# Patient Record
Sex: FEMALE | Race: WHITE | NOT HISPANIC OR LATINO | Employment: FULL TIME | URBAN - NONMETROPOLITAN AREA
[De-identification: names, ages, dates, MRNs, and addresses within clinical notes are randomized per-mention and may not be internally consistent; named-entity substitution may affect disease eponyms.]

---

## 2017-08-31 ENCOUNTER — TRANSCRIBE ORDERS (OUTPATIENT)
Dept: MRI IMAGING | Facility: HOSPITAL | Age: 57
End: 2017-08-31

## 2017-08-31 DIAGNOSIS — M85.38: Primary | ICD-10-CM

## 2017-09-11 ENCOUNTER — HOSPITAL ENCOUNTER (OUTPATIENT)
Dept: MRI IMAGING | Facility: HOSPITAL | Age: 57
Discharge: HOME | End: 2017-09-11
Attending: ORTHOPAEDIC SURGERY | Admitting: ORTHOPAEDIC SURGERY

## 2017-09-11 DIAGNOSIS — M85.38: ICD-10-CM

## 2017-09-11 PROCEDURE — 71550 MRI CHEST W/O DYE: CPT

## 2017-10-17 ENCOUNTER — TRANSCRIBE ORDERS (OUTPATIENT)
Dept: ADMINISTRATIVE | Facility: HOSPITAL | Age: 57
End: 2017-10-17

## 2017-10-17 ENCOUNTER — HOSPITAL ENCOUNTER (OUTPATIENT)
Dept: CARDIOLOGY | Facility: HOSPITAL | Age: 57
Discharge: HOME | End: 2017-10-17
Attending: ORTHOPAEDIC SURGERY | Admitting: ORTHOPAEDIC SURGERY

## 2017-10-17 DIAGNOSIS — Z01.818 PRE-OP EXAM: Primary | ICD-10-CM

## 2017-10-17 LAB
ANION GAP SERPL CALCULATED.3IONS-SCNC: 14.4 MMOL/L
BUN BLD-MCNC: 17 MG/DL (ref 7–20)
BUN/CREAT SERPL: 18.9 (ref 7.1–23.5)
CALCIUM SPEC-SCNC: 9.3 MG/DL (ref 8.4–10.2)
CHLORIDE SERPL-SCNC: 106 MMOL/L (ref 98–107)
CO2 SERPL-SCNC: 29 MMOL/L (ref 26–30)
CREAT BLD-MCNC: 0.9 MG/DL (ref 0.6–1.3)
GFR SERPL CREATININE-BSD FRML MDRD: 64 ML/MIN/1.73
GLUCOSE BLD-MCNC: 85 MG/DL (ref 74–98)
POTASSIUM BLD-SCNC: 5.4 MMOL/L (ref 3.5–5.1)
SODIUM BLD-SCNC: 144 MMOL/L (ref 137–145)

## 2017-10-17 PROCEDURE — 80048 BASIC METABOLIC PNL TOTAL CA: CPT | Performed by: ORTHOPAEDIC SURGERY

## 2017-10-17 PROCEDURE — 93005 ELECTROCARDIOGRAM TRACING: CPT

## 2017-10-17 PROCEDURE — 36415 COLL VENOUS BLD VENIPUNCTURE: CPT | Performed by: ORTHOPAEDIC SURGERY

## 2017-10-17 PROCEDURE — 93005 ELECTROCARDIOGRAM TRACING: CPT | Performed by: ORTHOPAEDIC SURGERY

## 2019-11-11 DIAGNOSIS — M25.522 ARTHRALGIA OF LEFT ELBOW: Primary | ICD-10-CM

## 2019-11-19 ENCOUNTER — OFFICE VISIT (OUTPATIENT)
Dept: ORTHOPEDIC SURGERY | Facility: CLINIC | Age: 59
End: 2019-11-19

## 2019-11-19 VITALS — RESPIRATION RATE: 18 BRPM | HEIGHT: 67 IN | WEIGHT: 175 LBS | BODY MASS INDEX: 27.47 KG/M2

## 2019-11-19 DIAGNOSIS — M77.12 LATERAL EPICONDYLITIS OF LEFT ELBOW: ICD-10-CM

## 2019-11-19 DIAGNOSIS — M25.522 ARTHRALGIA OF LEFT ELBOW: Primary | ICD-10-CM

## 2019-11-19 PROCEDURE — 99203 OFFICE O/P NEW LOW 30 MIN: CPT | Performed by: PHYSICIAN ASSISTANT

## 2019-11-19 RX ORDER — OMEPRAZOLE 40 MG/1
CAPSULE, DELAYED RELEASE ORAL
COMMUNITY
Start: 2019-11-01 | End: 2020-08-20 | Stop reason: SINTOL

## 2019-11-19 RX ORDER — LORATADINE 10 MG/1
TABLET ORAL
COMMUNITY
Start: 2019-10-31 | End: 2020-08-20

## 2019-11-19 NOTE — PROGRESS NOTES
Subjective   Patient ID: Hayley Colon is a 59 y.o. right hand dominant female  Pain of the Left Elbow (No treatment, PCP did order EMG and MRI, no injury, ongoing for about five months)         History of Present Illness  Patient presents as a new patient with complaints of left elbow pain.  Pain is been ongoing for about 5 months.  No injury or trauma.  She denies numbness or tingling.  Her primary care doctor was able to order an MRI of the left elbow which she brings the results and disc with her.  She has tried ibuprofen which helps somewhat.  She attributes her left elbow pain due to overuse after having a right shoulder surgery she depended on her left upper extremity more so.    Pain Score: 5  Pain Location: (elbow)  Pain Orientation: Left     Pain Descriptors: Aching, Sharp  Pain Frequency: Intermittent  Pain Onset: Ongoing     Clinical Progression: Gradually worsening  Aggravating Factors: Bending, Straightening        Pain Intervention(s): Home medication, Rest, Cold pack  Result of Injury: No       Past Medical History:   Diagnosis Date   • GERD (gastroesophageal reflux disease)    • History of bone density study    • Periarthritis of shoulder    • Skin cancer, basal cell         Past Surgical History:   Procedure Laterality Date   • APPENDECTOMY     • SHOULDER SURGERY Right 1996    RCR, removed part of collar bone, bicep repair       Family History   Problem Relation Age of Onset   • Cancer Maternal Uncle         bone cancer   • Cancer Maternal Grandmother         bone       Social History     Socioeconomic History   • Marital status:      Spouse name: Not on file   • Number of children: Not on file   • Years of education: Not on file   • Highest education level: Not on file   Tobacco Use   • Smoking status: Never Smoker   • Smokeless tobacco: Never Used   Substance and Sexual Activity   • Alcohol use: No     Frequency: Never   • Drug use: No   • Sexual activity: Defer         Current Outpatient  "Medications:   •  Ibuprofen-diphenhydrAMINE Cit (ADVIL PM PO), Take  by mouth., Disp: , Rfl:   •  loratadine (CLARITIN) 10 MG tablet, , Disp: , Rfl:   •  omeprazole (priLOSEC) 40 MG capsule, , Disp: , Rfl:     No Known Allergies    Review of Systems   Constitutional: Negative for diaphoresis, fever and unexpected weight change.   HENT: Negative for dental problem and sore throat.    Eyes: Negative for visual disturbance.   Respiratory: Negative for shortness of breath.    Cardiovascular: Negative for chest pain.   Gastrointestinal: Negative for abdominal pain, constipation, diarrhea, nausea and vomiting.   Genitourinary: Negative for difficulty urinating and frequency.   Musculoskeletal: Positive for arthralgias.   Neurological: Negative for headaches.   Hematological: Does not bruise/bleed easily.       I have reviewed the above medical and surgical history, family history, social history, medications, allergies and review of systems.    Objective   Resp 18   Ht 168.9 cm (66.5\")   Wt 79.4 kg (175 lb)   BMI 27.82 kg/m²    Physical Exam   Constitutional: She is oriented to person, place, and time. She appears well-developed and well-nourished.   Eyes: Conjunctivae are normal.   Pulmonary/Chest: Effort normal.   Musculoskeletal:        Left elbow: She exhibits normal range of motion, no swelling, no effusion, no deformity and no laceration. Tenderness found. Lateral epicondyle tenderness noted.        Left wrist: She exhibits normal range of motion, no tenderness, no bony tenderness, no swelling, no effusion, no crepitus, no deformity and no laceration.        Left hand: She exhibits normal range of motion, no tenderness, normal capillary refill and no deformity. Normal sensation noted. Normal strength noted.   Neurological: She is alert and oriented to person, place, and time.   Psychiatric: She has a normal mood and affect. Her behavior is normal.   Nursing note and vitals reviewed.    Left Elbow Exam     Range " of Motion   Extension: normal   Flexion: normal   Pronation: normal   Supination: normal     Muscle Strength   The patient has normal left elbow strength.    Tests   Tinel's sign (cubital tunnel): negative    Other   Sensation: normal  Pulse: present             Neurologic Exam     Mental Status   Oriented to person, place, and time.               Assessment/Plan   Independent Review of Radiographic Studies:    No new imaging done today.    I did review MRI results with the patient the left elbow.  There does appear to be inflammation of the common extensor tendon  Procedures       Hayley was seen today for pain.    Diagnoses and all orders for this visit:    Arthralgia of left elbow    Lateral epicondylitis of left elbow       Orthopedic activities reviewed and patient expressed appreciation  Discussion of orthopedic goals  Risk, benefits, and merits of treatment alternatives reviewed with the patient and questions answered  Physical therapy referral given    Recommendations/Plan:  Exercise, medications, injections, other patient advice, and return appointment as noted.  Patient is encouraged to call or return for any issues or concerns.    Will refer to hand OT for a custom wrist brace.  Patient was given an elbow counterforce brace here in the office.  She was instructed on ice applications and anti-inflammatory use.  Patient agreeable to call or return sooner for any concerns.           EMR Dragon-transcription disclaimer:  This encounter note is an electronic transcription of spoken language to printed text.  Electronic transcription of spoken language may permit erroneous or at times nonsensical words or phrases to be inadvertently transcribed.  Although I have reviewed the note for such errors, some may still exist

## 2020-08-20 ENCOUNTER — OFFICE VISIT (OUTPATIENT)
Dept: GASTROENTEROLOGY | Facility: CLINIC | Age: 60
End: 2020-08-20

## 2020-08-20 VITALS
DIASTOLIC BLOOD PRESSURE: 76 MMHG | HEIGHT: 66 IN | WEIGHT: 175 LBS | BODY MASS INDEX: 28.12 KG/M2 | TEMPERATURE: 97.1 F | SYSTOLIC BLOOD PRESSURE: 130 MMHG | RESPIRATION RATE: 16 BRPM | HEART RATE: 55 BPM

## 2020-08-20 DIAGNOSIS — R19.5 POSITIVE FECAL OCCULT BLOOD TEST: Primary | ICD-10-CM

## 2020-08-20 DIAGNOSIS — R13.19 ESOPHAGEAL DYSPHAGIA: Chronic | ICD-10-CM

## 2020-08-20 DIAGNOSIS — Z01.812 PRE-PROCEDURE LAB EXAM: Primary | ICD-10-CM

## 2020-08-20 DIAGNOSIS — R10.814 LEFT LOWER QUADRANT ABDOMINAL TENDERNESS WITHOUT REBOUND TENDERNESS: ICD-10-CM

## 2020-08-20 DIAGNOSIS — R12 HEARTBURN: Chronic | ICD-10-CM

## 2020-08-20 DIAGNOSIS — R19.7 DIARRHEA, UNSPECIFIED TYPE: Chronic | ICD-10-CM

## 2020-08-20 PROBLEM — R10.32 LEFT LOWER QUADRANT ABDOMINAL PAIN: Status: ACTIVE | Noted: 2020-08-20

## 2020-08-20 PROCEDURE — 99204 OFFICE O/P NEW MOD 45 MIN: CPT | Performed by: NURSE PRACTITIONER

## 2020-08-20 RX ORDER — FAMOTIDINE 10 MG
10 TABLET ORAL AS NEEDED
COMMUNITY
End: 2020-08-20 | Stop reason: SDUPTHER

## 2020-08-20 RX ORDER — AMOXICILLIN AND CLAVULANATE POTASSIUM 500; 125 MG/1; MG/1
1 TABLET, FILM COATED ORAL 2 TIMES DAILY
Qty: 20 TABLET | Refills: 0 | Status: SHIPPED | OUTPATIENT
Start: 2020-08-20 | End: 2020-08-30

## 2020-08-20 RX ORDER — SODIUM CHLORIDE 9 MG/ML
70 INJECTION, SOLUTION INTRAVENOUS CONTINUOUS PRN
Status: CANCELLED | OUTPATIENT
Start: 2020-08-20

## 2020-08-20 RX ORDER — BISACODYL 5 MG/1
TABLET, DELAYED RELEASE ORAL
Qty: 4 TABLET | Refills: 0 | Status: SHIPPED | OUTPATIENT
Start: 2020-08-20 | End: 2020-09-18 | Stop reason: HOSPADM

## 2020-08-20 RX ORDER — SERTRALINE HYDROCHLORIDE 25 MG/1
25 TABLET, FILM COATED ORAL DAILY
COMMUNITY

## 2020-08-20 RX ORDER — FAMOTIDINE 20 MG/1
20 TABLET, FILM COATED ORAL 2 TIMES DAILY
Qty: 60 TABLET | Refills: 5 | Status: SHIPPED | OUTPATIENT
Start: 2020-08-20 | End: 2020-11-30 | Stop reason: SDUPTHER

## 2020-08-20 NOTE — PATIENT INSTRUCTIONS
1. Antireflux measures: Avoid fried, fatty foods, alcohol, chocolate, coffee, tea,  soft drinks, peppermint and spearmint, spicy foods, tomatoes and tomato based foods, onion based foods, and smoking. Other antireflux measures include weight reduction if overweight, avoiding tight clothing around the abdomen, elevating the head of the bed 6 inches with blocks under the head board, and don't drink or eat before going to bed and avoid lying down immediately after meals.  2. Pepcid 20 mg twice per day.  3. Treatment for diverticulitis:  A. Low-fat low fiber diet for 5 days thereafter low-fat high-fiber diet.   B. Augmentin 1 tablet by mouth twice a day.  C. Avoid laxatives, enemas for next 5 days. However, for constipation the patient may use stool softeners.  D. Patient may take probiotics while taking antibiotics, and continue for an additional 1-2 weeks.  E. Colonoscopy: Description of the procedure, risks, benefits, alternatives and options, including nonoperative options, were discussed with the patient in detail. The patient understands and wishes to proceed, although it may be advisable to wait 3-4 weeks to schedule procedure.  F. COVID-19 testing prior to procedure. The patient will need to self-quarantine after testing until the procedure. Instructions given to patient.  4. Upper endoscopy-EGD: Description of the procedure, risks, benefits, alternatives and options, including nonoperative options, were discussed with the patient in detail. The patient understands and wishes to proceed.  5. COVID-19 testing prior to procedure. The patient will need to self-quarantine after testing until the procedure. Instructions given to patient.

## 2020-08-20 NOTE — PROGRESS NOTES
"     New Patient Consult      Date: 2020   Patient Name: Hayley Colon  MRN: [id number]  : 1960     Primary Care Provider: Jose Ridley MD    Chief Complaint   Patient presents with   • Diarrhea     History of Present Illness: Hayley Colon is a 60 y.o. female who is here today to establish care with Gastroenterology for diarrhea.     The patient has a history of diarrhea for the past 8 weeks. She usually has 7-8 loose to watery bowel movements daily. The patient did not travel, take antibiotics or been around anyone ill. The patient traveled a lot while she was in the , but she has not been overseas in over 2 years.The patient had negative stool for culture for salmonella, shigella, campylobacter and c-diff. Fecal occult blood test positive. There is no history of rectal bleeding. She has not felt well since diarrhea started and has gradually worsened.    The patient has been having acid reflux for several years, but it has gradually worsened. She has been taking Pepcid once a day with moderate control. Reflux is severe. She has difficulty swallowing solids intermittently for several years that is gradually worsening. No difficulty swallowing liquids.    Last colonoscopy was in  and was \"normal\" per patient. There is no family history of colon cancer or inflammatory bowel disease. The patient has not had an EGD in the past.    Subjective      Past Medical History:   Diagnosis Date   • Anxiety    • GERD (gastroesophageal reflux disease)    • History of bone density study    • Migraines    • Periarthritis of shoulder    • Skin cancer, basal cell      Past Surgical History:   Procedure Laterality Date   • APPENDECTOMY     • COLONOSCOPY     • HYSTERECTOMY     • SHOULDER SURGERY Right     RCR, removed part of collar bone, bicep repair     Family History   Problem Relation Age of Onset   • Cancer Maternal Uncle         bone cancer   • Cancer Maternal Grandmother         bone   • " Colon cancer Neg Hx    • Inflammatory bowel disease Neg Hx      Social History     Socioeconomic History   • Marital status:      Spouse name: Not on file   • Number of children: Not on file   • Years of education: Not on file   • Highest education level: Not on file   Tobacco Use   • Smoking status: Former Smoker   • Smokeless tobacco: Never Used   Substance and Sexual Activity   • Alcohol use: No     Frequency: Never   • Drug use: No   • Sexual activity: Defer       Current Outpatient Medications:   •  Ibuprofen-diphenhydrAMINE Cit (ADVIL PM PO), Take  by mouth., Disp: , Rfl:   •  sertraline (ZOLOFT) 25 MG tablet, Take 25 mg by mouth Daily., Disp: , Rfl:   •  amoxicillin-clavulanate (Augmentin) 500-125 MG per tablet, Take 1 tablet by mouth 2 (Two) Times a Day for 10 days., Disp: 20 tablet, Rfl: 0  •  bisacodyl (DULCOLAX) 5 MG EC tablet, Take as directed for colon prep, Disp: 4 tablet, Rfl: 0  •  famotidine (PEPCID) 20 MG tablet, Take 1 tablet by mouth 2 (Two) Times a Day., Disp: 60 tablet, Rfl: 5  •  polyethylene glycol (GoLYTELY) 236 g solution, Starting at 5 pm on day prior to procedure, drink 8 ounces every 30 minutes as directed, Disp: 4000 mL, Rfl: 0  •  Probiotic capsule, Take 1 capsule by mouth Daily., Disp: 30 capsule, Rfl: 1    No Known Allergies     Review of Systems   Constitutional: Negative for appetite change and unexpected weight loss.   HENT: Negative for trouble swallowing.    Eyes: Negative for blurred vision.   Respiratory: Negative for choking and chest tightness.    Cardiovascular: Negative for leg swelling.   Gastrointestinal: Positive for diarrhea. Negative for abdominal distention, abdominal pain, anal bleeding, blood in stool, constipation, nausea, rectal pain, vomiting, GERD and indigestion.   Endocrine: Negative for polyphagia.   Genitourinary: Negative for hematuria.   Musculoskeletal: Negative for arthralgias and myalgias.   Skin: Negative for rash.   Allergic/Immunologic:  Negative for food allergies.   Neurological: Negative for dizziness, syncope and confusion.   Hematological: Does not bruise/bleed easily.   Psychiatric/Behavioral: Negative for depressed mood.      The following portions of the patient's history were reviewed and updated as appropriate: allergies, current medications, past family history, past medical history, past social history, past surgical history and problem list.    Objective     Physical Exam   Constitutional: She is oriented to person, place, and time. She appears well-developed and well-nourished. No distress.   HENT:   Head: Normocephalic and atraumatic.   Right Ear: Hearing and external ear normal.   Left Ear: Hearing and external ear normal.   Nose: Nose normal.   Mouth/Throat: Oropharynx is clear and moist and mucous membranes are normal. Mucous membranes are not pale, not dry and not cyanotic. No oral lesions. No oropharyngeal exudate.   Eyes: Conjunctivae and EOM are normal. Right eye exhibits no discharge. Left eye exhibits no discharge.   Neck: Trachea normal. Neck supple. No JVD present. No edema present. No thyroid mass and no thyromegaly present.   Cardiovascular: Normal rate, regular rhythm, S2 normal and normal heart sounds. Exam reveals no gallop, no S3 and no friction rub.   No murmur heard.  Pulmonary/Chest: Effort normal and breath sounds normal. No respiratory distress. She exhibits no tenderness.   Abdominal: Normal appearance and bowel sounds are normal. She exhibits no distension, no ascites and no mass. There is no splenomegaly or hepatomegaly. There is tenderness (mild to moderate). There is no rigidity, no rebound and no guarding. No hernia.     Vascular Status -  Her right foot exhibits no edema. Her left foot exhibits no edema.  Lymphadenopathy:     She has no cervical adenopathy.        Left: No supraclavicular adenopathy present.   Neurological: She is alert and oriented to person, place, and time. She has normal strength. No  "cranial nerve deficit or sensory deficit.   Skin: No rash noted. She is not diaphoretic. No cyanosis. No pallor. Nails show no clubbing.   Psychiatric: She has a normal mood and affect.   Nursing note and vitals reviewed.    Vital Signs:   Vitals:    08/20/20 1100   BP: 130/76   Pulse: 55   Resp: 16   Temp: 97.1 °F (36.2 °C)   Weight: 79.4 kg (175 lb)   Height: 167.6 cm (66\")     Results Review:   I have reviewed the patient's new clinical and imaging results.    No visits with results within 180 Day(s) from this visit.   Latest known visit with results is:   Transcribe Orders on 10/17/2017   Component Date Value Ref Range Status   • Glucose 10/17/2017 85  74 - 98 mg/dL Final   • BUN 10/17/2017 17  7 - 20 mg/dL Final   • Creatinine 10/17/2017 0.90  0.60 - 1.30 mg/dL Final   • Sodium 10/17/2017 144  137 - 145 mmol/L Final   • Potassium 10/17/2017 5.4* 3.5 - 5.1 mmol/L Final   • Chloride 10/17/2017 106  98 - 107 mmol/L Final   • CO2 10/17/2017 29.0  26.0 - 30.0 mmol/L Final   • Calcium 10/17/2017 9.3  8.4 - 10.2 mg/dL Final   • eGFR Non  Amer 10/17/2017 64  >60 mL/min/1.73 Final   • BUN/Creatinine Ratio 10/17/2017 18.9  7.1 - 23.5 Final   • Anion Gap 10/17/2017 14.4  mmol/L Final      No radiology results for the last 90 days.     Dated 8/3/2020 fecal occult blood: Positive    Dated 8/4/2020 stool culture: Negative for Campylobacter, Salmonella or Shigella.  Ova and parasites negative C. difficile toxin negative    Assessment / Plan      1. Positive fecal occult blood test  The patient denies overt GI bleed. There is a history of diarrhea and left lower quadrant pain for the past 8 weeks.   Colonoscopy to rule out colorectal neoplastic disease.    - Case Request; Standing  - Implement Anesthesia Orders Day of Procedure; Standing  - Obtain Informed Consent; Standing  - Verify Bowel Prep Was Successful; Standing  - Oxygen Therapy- Nasal Cannula; 2 LPM; Titrate for SPO2: equal to or greater than, 90%; Standing  - " sodium chloride 0.9 % infusion  - Case Request  - polyethylene glycol (GoLYTELY) 236 g solution; Starting at 5 pm on day prior to procedure, drink 8 ounces every 30 minutes as directed  Dispense: 4000 mL; Refill: 0  - bisacodyl (DULCOLAX) 5 MG EC tablet; Take as directed for colon prep  Dispense: 4 tablet; Refill: 0    2. Diarrhea, unspecified type  Diarrhea for the past 8 weeks that is not improving. She has not traveled, taken antibiotics or been around anyone ill. Stool studies negative for salmonella, shigella, campylobacter or c-diff. O&P negative. The patient has tenderness in the left lower quadrant upon palpation. Will treat empirically with Augmentin.    - amoxicillin-clavulanate (Augmentin) 500-125 MG per tablet; Take 1 tablet by mouth 2 (Two) Times a Day for 10 days.  Dispense: 20 tablet; Refill: 0  - Probiotic capsule; Take 1 capsule by mouth Daily.  Dispense: 30 capsule; Refill: 1    3. Left lower quadrant abdominal pain  Mild to moderate tenderness in the left lower quadrant. No history of fevers or chills.    4. Esophageal dysphagia  Long standing history of difficulty swallowing solids with gradual worsening. No difficulty swallowing liquids.  EGD to rule out esophageal stricture.     - Case Request; Standing  - Implement Anesthesia Orders Day of Procedure; Standing  - Obtain Informed Consent; Standing  - Oxygen Therapy- Nasal Cannula; 2 LPM; Titrate for SPO2: equal to or greater than, 90%; Standing  - sodium chloride 0.9 % infusion  - Case Request    5. Heartburn  Gradually worsening. Moderate control with Pepcid once per day. The patient had taken PPI therapy in the past, but stopped it when diarrhea started as she was concerned PPI was causing diarrhea.   Will increase to Pepcid 20 mg 1 by mouth twice per day.    - famotidine (PEPCID) 20 MG tablet; Take 1 tablet by mouth 2 (Two) Times a Day.  Dispense: 60 tablet; Refill: 5    Patient Instructions   1. Antireflux measures: Avoid fried, fatty foods,  alcohol, chocolate, coffee, tea,  soft drinks, peppermint and spearmint, spicy foods, tomatoes and tomato based foods, onion based foods, and smoking. Other antireflux measures include weight reduction if overweight, avoiding tight clothing around the abdomen, elevating the head of the bed 6 inches with blocks under the head board, and don't drink or eat before going to bed and avoid lying down immediately after meals.  2. Pepcid 20 mg twice per day.  3. Treatment for diverticulitis:  A. Low-fat low fiber diet for 5 days thereafter low-fat high-fiber diet.   B. Augmentin 1 tablet by mouth twice a day.  C. Avoid laxatives, enemas for next 5 days. However, for constipation the patient may use stool softeners.  D. Patient may take probiotics while taking antibiotics, and continue for an additional 1-2 weeks.  E. Colonoscopy: Description of the procedure, risks, benefits, alternatives and options, including nonoperative options, were discussed with the patient in detail. The patient understands and wishes to proceed, although it may be advisable to wait 3-4 weeks to schedule procedure.  F. COVID-19 testing prior to procedure. The patient will need to self-quarantine after testing until the procedure. Instructions given to patient.  4. Upper endoscopy-EGD: Description of the procedure, risks, benefits, alternatives and options, including nonoperative options, were discussed with the patient in detail. The patient understands and wishes to proceed.  5. COVID-19 testing prior to procedure. The patient will need to self-quarantine after testing until the procedure. Instructions given to patient.     Elvis Garsia, APRN  8/20/2020    Please note that portions of this note may have been completed with a voice recognition program. Efforts were made to edit the dictations, but occasionally words are mistranscribed.

## 2020-09-15 ENCOUNTER — LAB (OUTPATIENT)
Dept: LAB | Facility: HOSPITAL | Age: 60
End: 2020-09-15

## 2020-09-15 DIAGNOSIS — Z01.812 PRE-PROCEDURE LAB EXAM: ICD-10-CM

## 2020-09-15 PROCEDURE — U0004 COV-19 TEST NON-CDC HGH THRU: HCPCS

## 2020-09-15 PROCEDURE — C9803 HOPD COVID-19 SPEC COLLECT: HCPCS

## 2020-09-16 LAB — SARS-COV-2 RNA NOSE QL NAA+PROBE: NOT DETECTED

## 2020-09-18 ENCOUNTER — HOSPITAL ENCOUNTER (OUTPATIENT)
Facility: HOSPITAL | Age: 60
Setting detail: HOSPITAL OUTPATIENT SURGERY
Discharge: HOME | End: 2020-09-18
Attending: INTERNAL MEDICINE | Admitting: INTERNAL MEDICINE

## 2020-09-18 ENCOUNTER — ANESTHESIA EVENT (OUTPATIENT)
Dept: GASTROENTEROLOGY | Facility: HOSPITAL | Age: 60
End: 2020-09-18

## 2020-09-18 ENCOUNTER — ANESTHESIA (OUTPATIENT)
Dept: GASTROENTEROLOGY | Facility: HOSPITAL | Age: 60
End: 2020-09-18

## 2020-09-18 VITALS
WEIGHT: 175 LBS | SYSTOLIC BLOOD PRESSURE: 111 MMHG | RESPIRATION RATE: 18 BRPM | DIASTOLIC BLOOD PRESSURE: 77 MMHG | TEMPERATURE: 98.7 F | HEART RATE: 72 BPM | OXYGEN SATURATION: 98 % | HEIGHT: 66 IN | BODY MASS INDEX: 28.12 KG/M2

## 2020-09-18 DIAGNOSIS — R19.7 DIARRHEA, UNSPECIFIED TYPE: ICD-10-CM

## 2020-09-18 DIAGNOSIS — R19.5 POSITIVE FECAL OCCULT BLOOD TEST: ICD-10-CM

## 2020-09-18 DIAGNOSIS — R10.32 LEFT LOWER QUADRANT ABDOMINAL PAIN: ICD-10-CM

## 2020-09-18 DIAGNOSIS — R10.814 LEFT LOWER QUADRANT ABDOMINAL TENDERNESS WITHOUT REBOUND TENDERNESS: ICD-10-CM

## 2020-09-18 PROCEDURE — 45380 COLONOSCOPY AND BIOPSY: CPT | Performed by: INTERNAL MEDICINE

## 2020-09-18 PROCEDURE — 88305 TISSUE EXAM BY PATHOLOGIST: CPT | Performed by: INTERNAL MEDICINE

## 2020-09-18 PROCEDURE — 25010000002 PROPOFOL 10 MG/ML EMULSION: Performed by: NURSE ANESTHETIST, CERTIFIED REGISTERED

## 2020-09-18 RX ORDER — MAGNESIUM HYDROXIDE 1200 MG/15ML
LIQUID ORAL AS NEEDED
Status: DISCONTINUED | OUTPATIENT
Start: 2020-09-18 | End: 2020-09-18 | Stop reason: HOSPADM

## 2020-09-18 RX ORDER — SODIUM CHLORIDE 9 MG/ML
70 INJECTION, SOLUTION INTRAVENOUS CONTINUOUS PRN
Status: DISCONTINUED | OUTPATIENT
Start: 2020-09-18 | End: 2020-09-18 | Stop reason: HOSPADM

## 2020-09-18 RX ORDER — PROPOFOL 10 MG/ML
VIAL (ML) INTRAVENOUS AS NEEDED
Status: DISCONTINUED | OUTPATIENT
Start: 2020-09-18 | End: 2020-09-18 | Stop reason: SURG

## 2020-09-18 RX ORDER — SODIUM CHLORIDE 0.9 % (FLUSH) 0.9 %
10 SYRINGE (ML) INJECTION AS NEEDED
Status: DISCONTINUED | OUTPATIENT
Start: 2020-09-18 | End: 2020-09-18 | Stop reason: HOSPADM

## 2020-09-18 RX ADMIN — PROPOFOL 120 MCG/KG/MIN: 10 INJECTION, EMULSION INTRAVENOUS at 10:01

## 2020-09-18 RX ADMIN — SODIUM CHLORIDE 70 ML/HR: 9 INJECTION, SOLUTION INTRAVENOUS at 08:43

## 2020-09-18 RX ADMIN — PROPOFOL 100 MG: 10 INJECTION, EMULSION INTRAVENOUS at 10:01

## 2020-09-18 RX ADMIN — PROPOFOL 50 MG: 10 INJECTION, EMULSION INTRAVENOUS at 10:11

## 2020-09-18 NOTE — H&P
[facility]  HISTORY AND PHYSICAL    Patient Name: Hayley Colon  : 1960  MRN: [id number]    Chief Complaint:   For colonoscopy    History Of Presenting Illness:    Chronic diarrhea  FOBT positive     Past Medical History:   Diagnosis Date   • Anxiety    • Blood in stool    • Diarrhea    • GERD (gastroesophageal reflux disease)    • History of bone density study    • Migraines    • Periarthritis of shoulder    • Skin cancer, basal cell     shoulder   • Wears glasses        Past Surgical History:   Procedure Laterality Date   • APPENDECTOMY     • CARPAL TUNNEL RELEASE Bilateral    • COLONOSCOPY     • HYSTERECTOMY      partial still has one complete ovary and one partial ovary   • SHOULDER SURGERY Right     RCR, removed part of collar bone, bicep repair/3pins in labriam   • SKIN BIOPSY Left     shoulder   • VAGINAL DELIVERY      x1       Social History     Socioeconomic History   • Marital status:      Spouse name: Not on file   • Number of children: Not on file   • Years of education: Not on file   • Highest education level: Not on file   Tobacco Use   • Smoking status: Former Smoker     Types: Cigarettes     Quit date:      Years since quittin.7   • Smokeless tobacco: Never Used   Substance and Sexual Activity   • Alcohol use: No     Frequency: Never   • Drug use: No   • Sexual activity: Defer       Family History   Problem Relation Age of Onset   • Cancer Maternal Uncle         bone cancer   • Cancer Maternal Grandmother         bone   • Colon cancer Neg Hx    • Inflammatory bowel disease Neg Hx        Prior to Admission Medications:  Medications Prior to Admission   Medication Sig Dispense Refill Last Dose   • bisacodyl (DULCOLAX) 5 MG EC tablet Take as directed for colon prep 4 tablet 0 2020 at 1900   • famotidine (PEPCID) 20 MG tablet Take 1 tablet by mouth 2 (Two) Times a Day. 60 tablet 5 2020 at 0900   • Ibuprofen-diphenhydrAMINE Cit (ADVIL PM PO) Take   by mouth.   Past Week at Unknown time   • polyethylene glycol (GoLYTELY) 236 g solution Starting at 5 pm on day prior to procedure, drink 8 ounces every 30 minutes as directed 4000 mL 0 9/17/2020 at 1500   • Probiotic capsule Take 1 capsule by mouth Daily. 30 capsule 1 9/17/2020 at 0900   • sertraline (ZOLOFT) 25 MG tablet Take 25 mg by mouth Daily.   9/17/2020 at 0900       Allergies:  No Known Allergies     Vitals: Temp:  [97.7 °F (36.5 °C)] 97.7 °F (36.5 °C)  Heart Rate:  [78] 78  Resp:  [16] 16  BP: (131)/(80) 131/80    Review Of Systems:  Constitutional:  Negative for chills, fever, and unexpected weight change.  Respiratory:  Negative for cough, chest tightness, shortness of breath, and wheezing.  Cardiovascular:  Negative for chest pain, palpitations, and leg swelling.  Gastrointestinal:  Negative for abdominal distention, abdominal pain, Nausea, vomiting.  Neurological:  Negative for Weakness, numbness, and headaches.     Physical Exam:    General Appearance:  Alert, cooperative, in no acute distress.   Lungs:   Clear to auscultation, respirations regular, even and                 unlabored.   Heart:  Regular rhythm and normal rate.   Abdomen:   Normal bowel sounds, no masses, no organomegaly. Soft, non-tender, non-distended   Neurologic: Alert and oriented x 3. Moves all four limbs equally       Plan: COLONOSCOPY (N/A)     Christopher Chris MD  9/18/2020

## 2020-09-18 NOTE — ANESTHESIA PREPROCEDURE EVALUATION
Anesthesia Evaluation     Patient summary reviewed and Nursing notes reviewed   NPO Solid Status: > 8 hours  NPO Liquid Status: > 8 hours           Airway   Mallampati: II  TM distance: >3 FB  Neck ROM: full  No difficulty expected  Dental - normal exam     Pulmonary    (+) a smoker Former, decreased breath sounds,   Cardiovascular         Neuro/Psych  GI/Hepatic/Renal/Endo    (+)  GERD,      Musculoskeletal     Abdominal    Substance History      OB/GYN          Other                        Anesthesia Plan    ASA 2     MAC   (Risks and benefits discussed including risk of aspiration, recall and dental damage. All patient questions answered.    Will continue with plan of care.)  intravenous induction     Anesthetic plan, all risks, benefits, and alternatives have been provided, discussed and informed consent has been obtained with: patient.

## 2020-09-18 NOTE — ANESTHESIA POSTPROCEDURE EVALUATION
Patient: Hayley Colon    Procedure Summary     Date: 09/18/20 [facility]    Anesthesia Start: 0953 Anesthesia Stop:     Procedure: COLONOSCOPY WITH BIOPSY (N/A Anus) Diagnosis:       Positive fecal occult blood test      Diarrhea, unspecified type      Left lower quadrant abdominal pain      (Positive fecal occult blood test [R19.5])      (Diarrhea, unspecified type [R19.7])      (Left lower quadrant abdominal pain [R10.32])    Surgeon: Christopher Chris MD Provider: Nakul Mazariegos CRNA    Anesthesia Type: MAC ASA Status: 2          Anesthesia Type: MAC    Vitals  HR 73  Sat 97  Resp 19  /69  Temp 98        Post Anesthesia Care and Evaluation    Patient location during evaluation: bedside  Patient participation: complete - patient participated  Level of consciousness: sleepy but conscious  Pain score: 0  Pain management: adequate  Airway patency: patent  Anesthetic complications: No anesthetic complications  PONV Status: none  Cardiovascular status: acceptable  Respiratory status: acceptable  Hydration status: acceptable

## 2020-09-18 NOTE — DISCHARGE SUMMARY
- Discharge patient to home (ambulatory).   - High fiber diet.   - Continue present medications.   - Avoid NSAID for 72 hours  - Await pathology results.   - Repeat colonoscopy in 5-10 years for surveillance.   - Return to GI office in 4 weeks.

## 2020-09-18 NOTE — DISCHARGE INSTRUCTIONS
Rest today  No pushing,pulling,tugging,heavy lifting, or strenuous activity   No major decision making,driving,or drinking alcoholic beverages for 24 hours due to the sedation you received  Always use good hand hygiene/washing technique  No driving on pain medication.    To assist you in voiding:  Drink plenty of fluids  Listen to running water while attempting to void.    If you are unable to urinate and you have an uncomfortable urge to void or it has been   6 hours since you were discharged, return to the Emergency Room.    - Discharge patient to home (ambulatory).   - High fiber diet.   - Continue present medications.   - Avoid NSAID for 72 hours  - Await pathology results.   - Repeat colonoscopy in 5-10 years for surveillance.   - Return to GI office in 4 weeks.

## 2020-09-23 LAB
LAB AP CASE REPORT: NORMAL
PATH REPORT.FINAL DX SPEC: NORMAL

## 2020-11-30 ENCOUNTER — OFFICE VISIT (OUTPATIENT)
Dept: GASTROENTEROLOGY | Facility: CLINIC | Age: 60
End: 2020-11-30

## 2020-11-30 VITALS
RESPIRATION RATE: 16 BRPM | SYSTOLIC BLOOD PRESSURE: 127 MMHG | HEART RATE: 72 BPM | WEIGHT: 178 LBS | DIASTOLIC BLOOD PRESSURE: 74 MMHG | TEMPERATURE: 97.1 F | BODY MASS INDEX: 28.61 KG/M2 | HEIGHT: 66 IN

## 2020-11-30 DIAGNOSIS — R19.5 POSITIVE FECAL OCCULT BLOOD TEST: Primary | ICD-10-CM

## 2020-11-30 DIAGNOSIS — Z01.818 PREOP TESTING: Primary | ICD-10-CM

## 2020-11-30 DIAGNOSIS — R13.19 ESOPHAGEAL DYSPHAGIA: ICD-10-CM

## 2020-11-30 DIAGNOSIS — K21.9 GASTROESOPHAGEAL REFLUX DISEASE, UNSPECIFIED WHETHER ESOPHAGITIS PRESENT: ICD-10-CM

## 2020-11-30 DIAGNOSIS — K63.5 HYPERPLASTIC COLONIC POLYP, UNSPECIFIED PART OF COLON: ICD-10-CM

## 2020-11-30 DIAGNOSIS — K58.0 IRRITABLE BOWEL SYNDROME WITH DIARRHEA: ICD-10-CM

## 2020-11-30 PROBLEM — M72.2 PLANTAR FASCIITIS: Status: ACTIVE | Noted: 2020-11-16

## 2020-11-30 PROBLEM — F32.A DEPRESSIVE DISORDER: Status: ACTIVE | Noted: 2020-11-17

## 2020-11-30 PROBLEM — F41.9 ANXIETY: Status: ACTIVE | Noted: 2020-11-16

## 2020-11-30 PROBLEM — H26.9 BILATERAL CATARACTS: Status: ACTIVE | Noted: 2020-11-17

## 2020-11-30 PROCEDURE — 99214 OFFICE O/P EST MOD 30 MIN: CPT | Performed by: NURSE PRACTITIONER

## 2020-11-30 RX ORDER — FAMOTIDINE 20 MG/1
20 TABLET, FILM COATED ORAL 2 TIMES DAILY
Qty: 60 TABLET | Refills: 5 | Status: SHIPPED | OUTPATIENT
Start: 2020-11-30

## 2020-11-30 RX ORDER — DICYCLOMINE HCL 20 MG
20 TABLET ORAL 3 TIMES DAILY PRN
Qty: 30 TABLET | Refills: 1 | Status: SHIPPED | OUTPATIENT
Start: 2020-11-30

## 2020-11-30 RX ORDER — AMOXICILLIN 250 MG
2000 CAPSULE ORAL DAILY
COMMUNITY

## 2020-11-30 RX ORDER — SODIUM CHLORIDE 9 MG/ML
70 INJECTION, SOLUTION INTRAVENOUS CONTINUOUS PRN
Status: CANCELLED | OUTPATIENT
Start: 2020-11-30

## 2020-11-30 NOTE — PROGRESS NOTES
"     Follow Up Note     Date: 2020   Patient Name: Hayley Colon  MRN: [id number]  : 1960     Primary Care Provider: Justin Arroyo MD     Chief Complaint:    Chief Complaint   Patient presents with   • Follow-up     History of present illness:   2020  Hayley Colon is a 60 y.o. female who is here today for follow up for diarrhea.     Diarrhea has improved. She may have a few episodes per week. Stress makes symptoms worse. She was unable to have EGD done as her  had gotten sick and was in the hospital. She would like to reschedule.    Interval History:  2020  The patient has a history of diarrhea for the past 8 weeks. She usually has 7-8 loose to watery bowel movements daily. The patient did not travel, take antibiotics or been around anyone ill. The patient traveled a lot while she was in the , but she has not been overseas in over 2 years.The patient had negative stool for culture for salmonella, shigella, campylobacter and c-diff. Fecal occult blood test positive. There is no history of rectal bleeding. She has not felt well since diarrhea started and has gradually worsened.     The patient has been having acid reflux for several years, but it has gradually worsened. She has been taking Pepcid once a day with moderate control. Reflux is severe. She has difficulty swallowing solids intermittently for several years that is gradually worsening. No difficulty swallowing liquids.     Last colonoscopy was in  and was \"normal\" per patient. There is no family history of colon cancer or inflammatory bowel disease. The patient has not had an EGD in the past.    Subjective      Past Medical History:   Diagnosis Date   • Anxiety    • Blood in stool    • Diarrhea    • GERD (gastroesophageal reflux disease)    • History of bone density study    • Migraines    • Periarthritis of shoulder    • Skin cancer, basal cell     shoulder   • Wears glasses      Past Surgical " History:   Procedure Laterality Date   • APPENDECTOMY     • CARPAL TUNNEL RELEASE Bilateral    • COLONOSCOPY     • COLONOSCOPY N/A 2020    Procedure: COLONOSCOPY WITH BIOPSY, COLD BIOPSY POLYPECTOMY;  Surgeon: Christopher Chris MD;  Location: [facility] END[facility]COPY;  Service: Gastroenterology;  Laterality: N/A;   • HYSTERECTOMY      partial still has one complete ovary and one partial ovary   • SHOULDER SURGERY Right     RCR, removed part of collar bone, bicep repair/3pins in labriam   • SKIN BIOPSY Left     shoulder   • VAGINAL DELIVERY      x1     Family History   Problem Relation Age of Onset   • Cancer Maternal Uncle         bone cancer   • Cancer Maternal Grandmother         bone   • Colon cancer Neg Hx    • Inflammatory bowel disease Neg Hx      Social History     Socioeconomic History   • Marital status:      Spouse name: Not on file   • Number of children: Not on file   • Years of education: Not on file   • Highest education level: Not on file   Tobacco Use   • Smoking status: Former Smoker     Types: Cigarettes     Quit date:      Years since quittin.9   • Smokeless tobacco: Never Used   Substance and Sexual Activity   • Alcohol use: No     Frequency: Never   • Drug use: No   • Sexual activity: Defer       Current Outpatient Medications:   •  BIOTIN PO, Take 2,000 mcg by mouth., Disp: , Rfl:   •  Cobalamin Combinations (B-12) 100-5000 MCG sublingual tablet, Place 2,000 mcg under the tongue Daily., Disp: , Rfl:   •  famotidine (PEPCID) 20 MG tablet, Take 1 tablet by mouth 2 (Two) Times a Day., Disp: 60 tablet, Rfl: 5  •  Ibuprofen-diphenhydrAMINE Cit (ADVIL PM PO), Take  by mouth., Disp: , Rfl:   •  Probiotic capsule, Take 1 capsule by mouth Daily., Disp: 30 capsule, Rfl: 1  •  sertraline (ZOLOFT) 25 MG tablet, Take 25 mg by mouth Daily., Disp: , Rfl:   •  dicyclomine (Bentyl) 20 MG tablet, Take 1 tablet by mouth 3 (Three) Times a Day As Needed (lower abdominal pain and  diarrhea)., Disp: 30 tablet, Rfl: 1     No Known Allergies     Review of Systems   Constitutional: Negative for appetite change and unexpected weight loss.   HENT: Negative for trouble swallowing.    Eyes: Negative for blurred vision.   Respiratory: Negative for choking and chest tightness.    Cardiovascular: Negative for leg swelling.   Gastrointestinal: Positive for diarrhea and GERD. Negative for abdominal distention, abdominal pain, anal bleeding, blood in stool, constipation, nausea, rectal pain, vomiting and indigestion.   Endocrine: Negative for polyphagia.   Genitourinary: Negative for hematuria.   Musculoskeletal: Negative for arthralgias and myalgias.   Skin: Negative for rash.   Allergic/Immunologic: Negative for food allergies.   Neurological: Negative for dizziness, syncope and confusion.   Hematological: Does not bruise/bleed easily.   Psychiatric/Behavioral: Negative for depressed mood.      The following portions of the patient's history were reviewed and updated as appropriate: allergies, current medications, past family history, past medical history, past social history, past surgical history and problem list.    Objective     Physical Exam  Vitals signs and nursing note reviewed.   Constitutional:       General: She is awake. She is not in acute distress.     Appearance: Normal appearance. She is well-developed. She is not diaphoretic.   HENT:      Head: Normocephalic and atraumatic.      Right Ear: Hearing and external ear normal.      Left Ear: Hearing and external ear normal.      Nose: Nose normal.      Mouth/Throat:      Lips: Pink.      Mouth: Mucous membranes are not pale, not dry and not cyanotic. No oral lesions.      Pharynx: No oropharyngeal exudate.   Eyes:      General: Lids are normal.         Right eye: No discharge.         Left eye: No discharge.      Conjunctiva/sclera: Conjunctivae normal.   Neck:      Musculoskeletal: Neck supple. No edema.      Thyroid: No thyroid mass or  "thyromegaly.      Vascular: No JVD.      Trachea: Trachea normal.   Cardiovascular:      Rate and Rhythm: Normal rate and regular rhythm.      Heart sounds: Normal heart sounds and S2 normal. No murmur. No friction rub. No gallop. No S3 sounds.    Pulmonary:      Effort: Pulmonary effort is normal. No respiratory distress.      Breath sounds: Normal breath sounds.   Chest:      Chest wall: No tenderness.   Abdominal:      General: Bowel sounds are normal. There is no distension.      Palpations: Abdomen is not rigid. There is no hepatomegaly, splenomegaly or mass.      Tenderness: There is no abdominal tenderness. There is no guarding or rebound.      Hernia: No hernia is present.   Musculoskeletal: Normal range of motion.   Lymphadenopathy:      Cervical: No cervical adenopathy.      Upper Body:      Left upper body: No supraclavicular adenopathy.   Skin:     General: Skin is warm and dry.      Coloration: Skin is not pale.      Findings: No rash.      Nails: There is no clubbing.     Neurological:      Mental Status: She is alert and oriented to person, place, and time.      Cranial Nerves: No cranial nerve deficit.      Sensory: No sensory deficit.   Psychiatric:         Mood and Affect: Mood normal.         Speech: Speech normal.         Behavior: Behavior is cooperative.       Vitals:    11/30/20 1322   BP: 127/74   Pulse: 72   Resp: 16   Temp: 97.1 °F (36.2 °C)   Weight: 80.7 kg (178 lb)   Height: 167.6 cm (66\")     Results Review:   I reviewed the patient's new clinical results.    Admission on 09/18/2020, Discharged on 09/18/2020   Component Date Value Ref Range Status   • Case Report 09/18/2020    Final                    Value:Surgical Pathology Report                         Case: [id number]                                  Authorizing Provider:  Christopher Chris MD  Collected:           09/18/2020 10:11 AM          Ordering Location:     [facility]    Received:            09/18/2020 " 02:31 PM                                 [facility]                                                                    Pathologist:           Romulo Jean MD                                                       Specimens:   1) - Small Intestine, Ileum, TERMINAL ILEUM BIOPSY                                                  2) - Large Intestine, RANDOM COLON BIOPSY FOR MICROSCOPIC COLITIS AND DIARRHEA                      3) - Large Intestine, Rectum                                                              • Final Diagnosis 09/18/2020    Final                    Value:This result contains rich text formatting which cannot be displayed here.   Lab on 09/15/2020   Component Date Value Ref Range Status   • SARS-CoV-2 MARIA TERESA 09/15/2020 Not Detected  Not Detected Final      No radiology results for the last 90 days.     Dated 8/3/2020 fecal occult blood: Positive     Dated 8/4/2020 stool culture: Negative for Campylobacter, Salmonella or Shigella.  Ova and parasites negative C. difficile toxin negative    Colonoscopy dated 9/18/2020 with a few diminutive polyps in the rectum resected and retrieved.  Clinically hyperplastic.  No endoscopic signs of colitis or ileitis.  Otherwise unremarkable.  Terminal ileum biopsy with no significant histopathologic abnormality.  No evidence of dysplasia, carcinoma or villous atrophy.  Random biopsies with no significant histopathologic abnormality.  No evidence of dysplasia, carcinoma or microscopic colitis.  Rectal polyp biopsy with fragments of hyperplastic polyp.  Negative for dysplasia or carcinoma.    Assessment / Plan      1. Positive fecal occult blood test  Positive FOB in August 2020. The patient denies overt GI bleed. Anemia status unknown, no recent labs. No source of positive FOB per colonoscopy. EGD to rule out upper GI source.  Basic labs    - Case Request; Standing  - Implement Anesthesia Orders Day of Procedure; Standing  - Obtain Informed Consent; Standing  -  Oxygen Therapy- Nasal Cannula; 2 LPM; Titrate for SPO2: equal to or greater than, 90%; Standing  - sodium chloride 0.9 % infusion  - Case Request    2. Gastroesophageal reflux disease, unspecified whether esophagitis present  Longstanding history of reflux it gradually worsened.  Reflux is severe.  She is taking Pepcid twice a day with reasonable control. Continue same for now.  Antireflux measures.    - famotidine (PEPCID) 20 MG tablet; Take 1 tablet by mouth 2 (Two) Times a Day.  Dispense: 60 tablet; Refill: 5    3. Esophageal dysphagia  The patient has a history of difficulty swallowing in the past. She has not had further episode since her colonoscopy. Will monitor for now.    4. Irritable bowel syndrome with diarrhea  Diarrhea improving.  No evidence of microscopic colitis or ileitis per colonoscopy.  Stool studies negative.  Suspect IBS-D.  Basic labs, celiac panel, TSH    - dicyclomine (Bentyl) 20 MG tablet; Take 1 tablet by mouth 3 (Three) Times a Day As Needed (lower abdominal pain and diarrhea).  Dispense: 30 tablet; Refill: 1  - CBC (No Diff); Future  - Comprehensive Metabolic Panel; Future  - TSH; Future  - IgA; Future  - Tissue Transglutaminase, IgA; Future    5. Hyperplastic colonic polyp, unspecified part of colon  Few diminutive polyps in the rectum resected and retrieved.  Hyperplastic, no dysplasia.  Colonoscopy in 5-10 years.    Patient Instructions   1. Antireflux measures: Avoid fried, fatty foods, alcohol, chocolate, coffee, tea,  soft drinks, peppermint and spearmint, spicy foods, tomatoes and tomato based foods, onion based foods, and smoking. Other antireflux measures include weight reduction if overweight, avoiding tight clothing around the abdomen, elevating the head of the bed 6 inches with blocks under the head board, and don't drink or eat before going to bed and avoid lying down immediately after meals.  2. Pepcid 20 mg 1 po twice a day.  3. Bentyl 20 mg 1 po 3 times per day as needed  for diarrhea.  4. Colonoscopy in 5-10 years.  5. Labs  6. Upper endoscopy-EGD: Description of the procedure, risks, benefits, alternatives and options, including nonoperative options, were discussed with the patient in detail. The patient understands and wishes to proceed.  7. COVID-19 testing prior to procedure. The patient will need to self-quarantine after testing until the procedure. Instructions given to patient.    Elvis Garsia, APRN  11/30/2020    Please note that portions of this note may have been completed with a voice recognition program. Efforts were made to edit the dictations, but occasionally words are mistranscribed.

## 2020-11-30 NOTE — PATIENT INSTRUCTIONS
1. Antireflux measures: Avoid fried, fatty foods, alcohol, chocolate, coffee, tea,  soft drinks, peppermint and spearmint, spicy foods, tomatoes and tomato based foods, onion based foods, and smoking. Other antireflux measures include weight reduction if overweight, avoiding tight clothing around the abdomen, elevating the head of the bed 6 inches with blocks under the head board, and don't drink or eat before going to bed and avoid lying down immediately after meals.  2. Pepcid 20 mg 1 po twice a day.  3. Bentyl 20 mg 1 po 3 times per day as needed for diarrhea.  4. Colonoscopy in 5-10 years.  5. Labs  6. Upper endoscopy-EGD: Description of the procedure, risks, benefits, alternatives and options, including nonoperative options, were discussed with the patient in detail. The patient understands and wishes to proceed.  7. COVID-19 testing prior to procedure. The patient will need to self-quarantine after testing until the procedure. Instructions given to patient.

## 2021-02-25 DIAGNOSIS — Z23 IMMUNIZATION DUE: ICD-10-CM

## 2024-03-04 ENCOUNTER — TRANSCRIBE ORDERS (OUTPATIENT)
Dept: LAB | Facility: HOSPITAL | Age: 64
End: 2024-03-04
Payer: OTHER GOVERNMENT

## 2024-03-04 ENCOUNTER — LAB (OUTPATIENT)
Dept: LAB | Facility: HOSPITAL | Age: 64
End: 2024-03-04
Payer: OTHER GOVERNMENT

## 2024-03-04 DIAGNOSIS — Z87.898 PERSONAL HISTORY OF OTHER LYMPHATIC AND HEMATOPOIETIC NEOPLASM: Primary | ICD-10-CM

## 2024-03-04 DIAGNOSIS — Z01.818 OTHER SPECIFIED PRE-OPERATIVE EXAMINATION: ICD-10-CM

## 2024-03-04 DIAGNOSIS — R73.09 IMPAIRED GLUCOSE TOLERANCE TEST: ICD-10-CM

## 2024-03-04 DIAGNOSIS — Z87.898 PERSONAL HISTORY OF OTHER LYMPHATIC AND HEMATOPOIETIC NEOPLASM: ICD-10-CM

## 2024-03-04 LAB
DEPRECATED RDW RBC AUTO: 43.5 FL (ref 37–54)
ERYTHROCYTE [DISTWIDTH] IN BLOOD BY AUTOMATED COUNT: 13 % (ref 12.3–15.4)
HCT VFR BLD AUTO: 41.1 % (ref 34–46.6)
HGB BLD-MCNC: 13.7 G/DL (ref 12–15.9)
MCH RBC QN AUTO: 30.5 PG (ref 26.6–33)
MCHC RBC AUTO-ENTMCNC: 33.3 G/DL (ref 31.5–35.7)
MCV RBC AUTO: 91.5 FL (ref 79–97)
PLATELET # BLD AUTO: 221 10*3/MM3 (ref 140–450)
PMV BLD AUTO: 12 FL (ref 6–12)
RBC # BLD AUTO: 4.49 10*6/MM3 (ref 3.77–5.28)
WBC NRBC COR # BLD AUTO: 5.68 10*3/MM3 (ref 3.4–10.8)

## 2024-03-04 PROCEDURE — 36415 COLL VENOUS BLD VENIPUNCTURE: CPT

## 2024-03-04 PROCEDURE — 80048 BASIC METABOLIC PNL TOTAL CA: CPT

## 2024-03-04 PROCEDURE — 85027 COMPLETE CBC AUTOMATED: CPT

## 2024-03-05 LAB
ANION GAP SERPL CALCULATED.3IONS-SCNC: 9 MMOL/L (ref 5–15)
BUN SERPL-MCNC: 19 MG/DL (ref 8–23)
BUN/CREAT SERPL: 18.8 (ref 7–25)
CALCIUM SPEC-SCNC: 9.6 MG/DL (ref 8.6–10.5)
CHLORIDE SERPL-SCNC: 106 MMOL/L (ref 98–107)
CO2 SERPL-SCNC: 26 MMOL/L (ref 22–29)
CREAT SERPL-MCNC: 1.01 MG/DL (ref 0.57–1)
EGFRCR SERPLBLD CKD-EPI 2021: 62.3 ML/MIN/1.73
GLUCOSE SERPL-MCNC: 76 MG/DL (ref 65–99)
POTASSIUM SERPL-SCNC: 4.9 MMOL/L (ref 3.5–5.2)
SODIUM SERPL-SCNC: 141 MMOL/L (ref 136–145)

## 2024-08-01 ENCOUNTER — OFFICE VISIT (OUTPATIENT)
Dept: OBSTETRICS AND GYNECOLOGY | Facility: CLINIC | Age: 64
End: 2024-08-01
Payer: MEDICARE

## 2024-08-01 VITALS
DIASTOLIC BLOOD PRESSURE: 78 MMHG | WEIGHT: 189 LBS | HEIGHT: 66 IN | BODY MASS INDEX: 30.37 KG/M2 | SYSTOLIC BLOOD PRESSURE: 122 MMHG

## 2024-08-01 DIAGNOSIS — Z79.890 HORMONE REPLACEMENT THERAPY (HRT): Primary | ICD-10-CM

## 2024-08-01 DIAGNOSIS — N95.1 MENOPAUSAL SYMPTOMS: ICD-10-CM

## 2024-08-01 DIAGNOSIS — N95.2 VAGINAL ATROPHY: ICD-10-CM

## 2024-08-01 DIAGNOSIS — Z90.710 H/O TOTAL HYSTERECTOMY: ICD-10-CM

## 2024-08-01 RX ORDER — ESTRADIOL 10 UG/1
1 INSERT VAGINAL 2 TIMES WEEKLY
Qty: 8 TABLET | Refills: 11 | Status: SHIPPED | OUTPATIENT
Start: 2024-08-01 | End: 2024-08-02 | Stop reason: SDUPTHER

## 2024-08-01 RX ORDER — ESTRADIOL 0.06 MG/D
1 PATCH TRANSDERMAL WEEKLY
Qty: 4 EACH | Refills: 11 | Status: SHIPPED | OUTPATIENT
Start: 2024-08-01 | End: 2024-08-02 | Stop reason: SDUPTHER

## 2024-08-02 DIAGNOSIS — Z90.710 H/O TOTAL HYSTERECTOMY: ICD-10-CM

## 2024-08-02 DIAGNOSIS — N95.1 MENOPAUSAL SYMPTOMS: ICD-10-CM

## 2024-08-02 DIAGNOSIS — Z79.890 HORMONE REPLACEMENT THERAPY (HRT): ICD-10-CM

## 2024-08-02 DIAGNOSIS — N95.2 VAGINAL ATROPHY: ICD-10-CM

## 2024-08-02 RX ORDER — ESTRADIOL 0.06 MG/D
1 PATCH TRANSDERMAL WEEKLY
Qty: 4 EACH | Refills: 11 | Status: SHIPPED | OUTPATIENT
Start: 2024-08-02

## 2024-08-02 RX ORDER — ESTRADIOL 10 UG/1
1 INSERT VAGINAL 2 TIMES WEEKLY
Qty: 8 TABLET | Refills: 11 | Status: SHIPPED | OUTPATIENT
Start: 2024-08-05

## 2024-08-02 NOTE — TELEPHONE ENCOUNTER
Caller: OLEG GHOSH    Relationship: SELF    Best call back number: [phone]    Requested Prescriptions:   Requested Prescriptions     Pending Prescriptions Disp Refills    estradiol (CLIMARA) 0.06 MG/24HR patch 4 each 11     Sig: Place 1 patch on the skin as directed by provider 1 (One) Time Per Week.    estradiol (Vagifem) 10 MCG tablet vaginal tablet 8 tablet 11     Sig: Insert 1 tablet into the vagina 2 (Two) Times a Week.        Pharmacy where request should be sent:    [facility] - [place], [place] - [address]  - [phone] [facility] [phone]    [address]  [place] [address]   Phone: [phone] Fax: [phone]   Hours: Not open 24 hours       Last office visit with prescribing clinician: 8/1/2024     Next office visit with prescribing clinician: 11/6/2024     Additional details provided by patient: PT CALLED AND REQUESTED THAT THE ABOVE RX'S BE SENT TO [facility] INSTEAD OF THE PREVIOUSLY LISTED [facility], PHARM WAS UPDATED IN PTS CHART, PT REQUESTED THAT [facility] BE REMOVED    Does the patient have less than a 3 day supply:  [] Yes  [] No    Would you like a call back once the refill request has been completed: [] Yes [x] No    If the office needs to give you a call back, can they leave a voicemail: [x] Yes [] No    Abdulkadir Stern Rep   08/02/24 11:00 EDT

## 2024-08-08 PROBLEM — N95.2 VAGINAL ATROPHY: Status: ACTIVE | Noted: 2024-08-08

## 2024-08-08 PROBLEM — Z90.710 H/O TOTAL HYSTERECTOMY: Status: ACTIVE | Noted: 2024-08-08

## 2024-08-08 PROBLEM — Z79.890 HORMONE REPLACEMENT THERAPY (HRT): Status: ACTIVE | Noted: 2024-08-08

## 2024-08-08 NOTE — PROGRESS NOTES
GYN Office Visit    Subjective   Chief Complaint   Patient presents with    Menopause     Wants to discuss hormone replacement, night sweats, hot flashes, vaginal dryness, brain fog, pain with intercourse, abnormal weight gain.     Hayley Colon is a 64 y.o. year old  presenting for discussion of menopausal symptoms.    She reports ongoing issues with hot flashes, night sweats, weight gain and pain with intercourse.  Previous hysterectomy.  No current hormonal medication.  She does have vaginal dryness.    OB Hx:  x 1, AB x 1  Pap smear: No history of cervical dysplasia  Mammogram:   Colonoscopy:     Past Medical History:   Diagnosis Date    Anxiety     Blood in stool     Diarrhea     GERD (gastroesophageal reflux disease)     History of bone density study     Migraines     Periarthritis of shoulder     Skin cancer, basal cell     shoulder    Wears glasses        Past Surgical History:   Procedure Laterality Date    APPENDECTOMY      CARPAL TUNNEL RELEASE Bilateral     COLONOSCOPY      COLONOSCOPY N/A 2020    Procedure: COLONOSCOPY WITH BIOPSY, COLD BIOPSY POLYPECTOMY;  Surgeon: Christopher Chris MD;  Location: [facility] ENDOSCOPY;  Service: Gastroenterology;  Laterality: N/A;    HYSTERECTOMY      partial still has one complete ovary and one partial ovary    SHOULDER SURGERY Right     RCR, removed part of collar bone, bicep repair/3pins in labriam    SKIN BIOPSY Left     shoulder    VAGINAL DELIVERY      x1       Family History   Problem Relation Age of Onset    Cancer Maternal Uncle         bone cancer    Cancer Maternal Grandmother         bone    Colon cancer Neg Hx     Inflammatory bowel disease Neg Hx         Social History     Tobacco Use    Smoking status: Former     Current packs/day: 0.00     Types: Cigarettes     Quit date:      Years since quittin.6    Smokeless tobacco: Never   Substance Use Topics    Alcohol use: No    Drug use: No       (Not in a hospital  "admission)      Patient has no known allergies.    Current Outpatient Medications on File Prior to Visit   Medication Sig Dispense Refill    BIOTIN PO Take 2,000 mcg by mouth.      Cobalamin Combinations (B-12) 100-5000 MCG sublingual tablet Place 2,000 mcg under the tongue Daily.      Ibuprofen-diphenhydrAMINE Cit (ADVIL PM PO) Take  by mouth.      Probiotic capsule Take 1 capsule by mouth Daily. 30 capsule 1     No current facility-administered medications on file prior to visit.       Social History    Tobacco Use      Smoking status: Former        Packs/day: 0.00        Types: Cigarettes        Quit date:         Years since quittin.6      Smokeless tobacco: Never         Objective   /78   Ht 167.6 cm (66\")   Wt 85.7 kg (189 lb)   BMI 30.51 kg/m²     Physical Exam:  General Appearance: alert, pleasant, appears stated age, interactive and cooperative         Medical Decision Making:    Assessment   Menopausal symptoms  Previous hysterectomy  Hormone replacement therapy  Vaginal atrophy + dyspareunia     Plan    No orders of the defined types were placed in this encounter.      Medication Management: Climara patches 0.06 mg/day + Vagifem 10 mcg tablets twice weekly    Procedures Performed: none    We reviewed her situation in detail today.  We discussed her symptoms.  We discussed menopause.  We discussed hormone replacement therapy.  Risks for HRT were discussed in detail today.  HRT start as above.  Add vaginal therapy as well.  I recommend yearly mammograms.    RTC in 6 months for reassessment.    Faheem Mason MD  Obstetrics and Gynecology  [facility]    "

## 2024-11-26 ENCOUNTER — APPOINTMENT (OUTPATIENT)
Dept: GENERAL RADIOLOGY | Facility: HOSPITAL | Age: 64
End: 2024-11-26
Payer: OTHER GOVERNMENT

## 2024-11-26 ENCOUNTER — HOSPITAL ENCOUNTER (EMERGENCY)
Facility: HOSPITAL | Age: 64
Discharge: HOME | End: 2024-11-26
Attending: STUDENT IN AN ORGANIZED HEALTH CARE EDUCATION/TRAINING PROGRAM
Payer: OTHER GOVERNMENT

## 2024-11-26 VITALS
HEIGHT: 66 IN | HEART RATE: 105 BPM | WEIGHT: 170 LBS | OXYGEN SATURATION: 91 % | RESPIRATION RATE: 18 BRPM | BODY MASS INDEX: 27.32 KG/M2 | SYSTOLIC BLOOD PRESSURE: 153 MMHG | TEMPERATURE: 102.2 F | DIASTOLIC BLOOD PRESSURE: 80 MMHG

## 2024-11-26 DIAGNOSIS — B34.9 VIRAL ILLNESS: Primary | ICD-10-CM

## 2024-11-26 LAB
ALBUMIN SERPL-MCNC: 4 G/DL (ref 3.5–5.2)
ALBUMIN/GLOB SERPL: 1.5 G/DL
ALP SERPL-CCNC: 75 U/L (ref 39–117)
ALT SERPL W P-5'-P-CCNC: 12 U/L (ref 1–33)
ANION GAP SERPL CALCULATED.3IONS-SCNC: 11.1 MMOL/L (ref 5–15)
AST SERPL-CCNC: 18 U/L (ref 1–32)
BASOPHILS # BLD AUTO: 0.01 10*3/MM3 (ref 0–0.2)
BASOPHILS NFR BLD AUTO: 0.1 % (ref 0–1.5)
BILIRUB SERPL-MCNC: 0.5 MG/DL (ref 0–1.2)
BILIRUB UR QL STRIP: NEGATIVE
BUN SERPL-MCNC: 9 MG/DL (ref 8–23)
BUN/CREAT SERPL: 9.5 (ref 7–25)
CALCIUM SPEC-SCNC: 8.9 MG/DL (ref 8.6–10.5)
CHLORIDE SERPL-SCNC: 100 MMOL/L (ref 98–107)
CLARITY UR: CLEAR
CO2 SERPL-SCNC: 23.9 MMOL/L (ref 22–29)
COLOR UR: YELLOW
CREAT SERPL-MCNC: 0.95 MG/DL (ref 0.57–1)
CRP SERPL-MCNC: 2.52 MG/DL (ref 0–0.5)
D-LACTATE SERPL-SCNC: 1.1 MMOL/L (ref 0.5–2)
DEPRECATED RDW RBC AUTO: 46.3 FL (ref 37–54)
EGFRCR SERPLBLD CKD-EPI 2021: 66.6 ML/MIN/1.73
EOSINOPHIL # BLD AUTO: 0.17 10*3/MM3 (ref 0–0.4)
EOSINOPHIL NFR BLD AUTO: 2.4 % (ref 0.3–6.2)
ERYTHROCYTE [DISTWIDTH] IN BLOOD BY AUTOMATED COUNT: 13.9 % (ref 12.3–15.4)
FLUAV SUBTYP SPEC NAA+PROBE: NOT DETECTED
FLUBV RNA ISLT QL NAA+PROBE: NOT DETECTED
GLOBULIN UR ELPH-MCNC: 2.7 GM/DL
GLUCOSE SERPL-MCNC: 92 MG/DL (ref 65–99)
GLUCOSE UR STRIP-MCNC: NEGATIVE MG/DL
HCT VFR BLD AUTO: 40.2 % (ref 34–46.6)
HGB BLD-MCNC: 13.8 G/DL (ref 12–15.9)
HGB UR QL STRIP.AUTO: NEGATIVE
IMM GRANULOCYTES # BLD AUTO: 0.03 10*3/MM3 (ref 0–0.05)
IMM GRANULOCYTES NFR BLD AUTO: 0.4 % (ref 0–0.5)
KETONES UR QL STRIP: NEGATIVE
LEUKOCYTE ESTERASE UR QL STRIP.AUTO: NEGATIVE
LIPASE SERPL-CCNC: 29 U/L (ref 13–60)
LYMPHOCYTES # BLD AUTO: 0.57 10*3/MM3 (ref 0.7–3.1)
LYMPHOCYTES NFR BLD AUTO: 8.2 % (ref 19.6–45.3)
MCH RBC QN AUTO: 30.9 PG (ref 26.6–33)
MCHC RBC AUTO-ENTMCNC: 34.3 G/DL (ref 31.5–35.7)
MCV RBC AUTO: 89.9 FL (ref 79–97)
MONOCYTES # BLD AUTO: 0.37 10*3/MM3 (ref 0.1–0.9)
MONOCYTES NFR BLD AUTO: 5.3 % (ref 5–12)
NEUTROPHILS NFR BLD AUTO: 5.8 10*3/MM3 (ref 1.7–7)
NEUTROPHILS NFR BLD AUTO: 83.6 % (ref 42.7–76)
NITRITE UR QL STRIP: NEGATIVE
NRBC BLD AUTO-RTO: 0 /100 WBC (ref 0–0.2)
NT-PROBNP SERPL-MCNC: 125.8 PG/ML (ref 0–900)
PH UR STRIP.AUTO: 5.5 [PH] (ref 5–8)
PLATELET # BLD AUTO: 191 10*3/MM3 (ref 140–450)
PMV BLD AUTO: 10.7 FL (ref 6–12)
POTASSIUM SERPL-SCNC: 4.1 MMOL/L (ref 3.5–5.2)
PROT SERPL-MCNC: 6.7 G/DL (ref 6–8.5)
PROT UR QL STRIP: NEGATIVE
RBC # BLD AUTO: 4.47 10*6/MM3 (ref 3.77–5.28)
SARS-COV-2 RNA RESP QL NAA+PROBE: NOT DETECTED
SODIUM SERPL-SCNC: 135 MMOL/L (ref 136–145)
SP GR UR STRIP: 1.01 (ref 1–1.03)
TROPONIN T SERPL HS-MCNC: 6 NG/L
UROBILINOGEN UR QL STRIP: NORMAL
WBC NRBC COR # BLD AUTO: 6.95 10*3/MM3 (ref 3.4–10.8)

## 2024-11-26 PROCEDURE — 84484 ASSAY OF TROPONIN QUANT: CPT | Performed by: STUDENT IN AN ORGANIZED HEALTH CARE EDUCATION/TRAINING PROGRAM

## 2024-11-26 PROCEDURE — 93005 ELECTROCARDIOGRAM TRACING: CPT | Performed by: STUDENT IN AN ORGANIZED HEALTH CARE EDUCATION/TRAINING PROGRAM

## 2024-11-26 PROCEDURE — 85025 COMPLETE CBC W/AUTO DIFF WBC: CPT | Performed by: STUDENT IN AN ORGANIZED HEALTH CARE EDUCATION/TRAINING PROGRAM

## 2024-11-26 PROCEDURE — 99284 EMERGENCY DEPT VISIT MOD MDM: CPT

## 2024-11-26 PROCEDURE — 71045 X-RAY EXAM CHEST 1 VIEW: CPT

## 2024-11-26 PROCEDURE — 86140 C-REACTIVE PROTEIN: CPT | Performed by: STUDENT IN AN ORGANIZED HEALTH CARE EDUCATION/TRAINING PROGRAM

## 2024-11-26 PROCEDURE — 83605 ASSAY OF LACTIC ACID: CPT | Performed by: STUDENT IN AN ORGANIZED HEALTH CARE EDUCATION/TRAINING PROGRAM

## 2024-11-26 PROCEDURE — 99283 EMERGENCY DEPT VISIT LOW MDM: CPT | Performed by: STUDENT IN AN ORGANIZED HEALTH CARE EDUCATION/TRAINING PROGRAM

## 2024-11-26 PROCEDURE — 80053 COMPREHEN METABOLIC PANEL: CPT | Performed by: STUDENT IN AN ORGANIZED HEALTH CARE EDUCATION/TRAINING PROGRAM

## 2024-11-26 PROCEDURE — 81003 URINALYSIS AUTO W/O SCOPE: CPT | Performed by: STUDENT IN AN ORGANIZED HEALTH CARE EDUCATION/TRAINING PROGRAM

## 2024-11-26 PROCEDURE — 83690 ASSAY OF LIPASE: CPT | Performed by: STUDENT IN AN ORGANIZED HEALTH CARE EDUCATION/TRAINING PROGRAM

## 2024-11-26 PROCEDURE — 87636 SARSCOV2 & INF A&B AMP PRB: CPT | Performed by: STUDENT IN AN ORGANIZED HEALTH CARE EDUCATION/TRAINING PROGRAM

## 2024-11-26 PROCEDURE — 83880 ASSAY OF NATRIURETIC PEPTIDE: CPT | Performed by: STUDENT IN AN ORGANIZED HEALTH CARE EDUCATION/TRAINING PROGRAM

## 2024-11-26 RX ORDER — ACETAMINOPHEN 325 MG/1
650 TABLET ORAL ONCE
Status: COMPLETED | OUTPATIENT
Start: 2024-11-26 | End: 2024-11-26

## 2024-11-26 RX ORDER — ONDANSETRON 4 MG/1
4 TABLET, ORALLY DISINTEGRATING ORAL EVERY 8 HOURS PRN
Qty: 20 TABLET | Refills: 0 | Status: SHIPPED | OUTPATIENT
Start: 2024-11-26 | End: 2024-11-26

## 2024-11-26 RX ORDER — IBUPROFEN 200 MG
400 TABLET ORAL ONCE
Status: COMPLETED | OUTPATIENT
Start: 2024-11-26 | End: 2024-11-26

## 2024-11-26 RX ORDER — ONDANSETRON 4 MG/1
4 TABLET, ORALLY DISINTEGRATING ORAL EVERY 8 HOURS PRN
Qty: 20 TABLET | Refills: 0 | Status: SHIPPED | OUTPATIENT
Start: 2024-11-26

## 2024-11-26 RX ADMIN — IBUPROFEN 400 MG: 200 TABLET, FILM COATED ORAL at 18:18

## 2024-11-26 RX ADMIN — ACETAMINOPHEN 650 MG: 325 TABLET, FILM COATED ORAL at 16:59

## 2024-11-26 NOTE — DISCHARGE INSTRUCTIONS
You were evaluated for fever, difficulty urinating and nausea and vomiting.  We got lab work tested your urine and got a chest x-ray that all showed no concerns for acute process.  You are now stable for discharge.  As we discussed, we believe your fever and symptoms may be related to a viral illness.  However, since you started your antibiotic, would recommend continuing this until you complete the antibiotic and recommend following up with your primary care doctor to ensure resolution of symptoms.  If you have chest pain, shortness of breath or severe increase in abdominal pain, please come back to the emergency department for further evaluation.  You are now stable for discharge.

## 2024-11-26 NOTE — Clinical Note
[facility] EMERGENCY DEPARTMENT  [address] [address]  Phone: [phone]    Hayley Colon was seen and treated in our emergency department on 11/26/2024.  She may return to work on 11/29/2024.         Thank you for choosing [facility].    Sergo Bhat MD

## 2024-11-26 NOTE — ED PROVIDER NOTES
Subjective:  History of Present Illness:    Patient is a 64-year-old female with history of GERD, migraines who presents today with fever, dysuria.  Onset of symptoms over the last 72 hours.  Prescribed Macrobid by her primary care doctor.  However, she has taken 1 dose of this, noted a fever today and was concern for the possibility of sepsis now presents to emergency department for evaluation.  She denies any chest pain or shortness of breath.  No abdominal pain.  Denies any new leg swelling or pain.  Does report an episode of nausea and vomiting prior to arrival.  Denies abnormal vaginal bleeding or discharge.      Nurses Notes reviewed and agree, including vitals, allergies, social history and prior medical history.     REVIEW OF SYSTEMS: All systems reviewed and not pertinent unless noted.  Review of Systems   Constitutional:  Positive for activity change, appetite change, chills, fatigue and fever.   HENT:  Positive for congestion. Negative for rhinorrhea, sinus pressure and sinus pain.    Eyes:  Negative for discharge and itching.   Respiratory:  Positive for cough. Negative for shortness of breath and wheezing.    Cardiovascular:  Negative for chest pain and leg swelling.   Gastrointestinal:  Positive for nausea and vomiting. Negative for abdominal distention, abdominal pain and diarrhea.   Endocrine: Negative for cold intolerance and heat intolerance.   Genitourinary:  Negative for decreased urine volume, difficulty urinating, flank pain, frequency, urgency, vaginal bleeding, vaginal discharge and vaginal pain.   Musculoskeletal:  Negative for gait problem, neck pain and neck stiffness.   Skin:  Negative for color change.   Allergic/Immunologic: Negative for environmental allergies.   Neurological:  Negative for seizures, syncope, facial asymmetry and speech difficulty.   Psychiatric/Behavioral:  Negative for self-injury and suicidal ideas.        Past Medical History:   Diagnosis Date    Anxiety     Blood  "in stool     Diarrhea     GERD (gastroesophageal reflux disease)     History of bone density study     Migraines     Periarthritis of shoulder     Skin cancer, basal cell     shoulder    Wears glasses        Allergies:    Patient has no known allergies.      Past Surgical History:   Procedure Laterality Date    APPENDECTOMY      CARPAL TUNNEL RELEASE Bilateral     COLONOSCOPY  2012    COLONOSCOPY N/A 2020    Procedure: COLONOSCOPY WITH BIOPSY, COLD BIOPSY POLYPECTOMY;  Surgeon: Christopher Chris MD;  [facility];  Service: Gastroenterology;  Laterality: N/A;    HYSTERECTOMY      partial still has one complete ovary and one partial ovary    SHOULDER SURGERY Right     RCR, removed part of collar bone, bicep repair/3pins in labriam    SKIN BIOPSY Left     shoulder    VAGINAL DELIVERY      x1         Social History     Socioeconomic History    Marital status:    Tobacco Use    Smoking status: Former     Current packs/day: 0.00     Types: Cigarettes     Quit date:      Years since quittin.9    Smokeless tobacco: Never   Substance and Sexual Activity    Alcohol use: No    Drug use: No    Sexual activity: Defer         Family History   Problem Relation Age of Onset    Cancer Maternal Uncle         bone cancer    Cancer Maternal Grandmother         bone    Colon cancer Neg Hx     Inflammatory bowel disease Neg Hx        Objective  Physical Exam:  /80   Pulse 105   Temp (!) 102.2 °F (39 °C) (Axillary)   Resp 18   Ht 167.6 cm (66\")   Wt 77.1 kg (170 lb)   SpO2 91%   BMI 27.44 kg/m²      Physical Exam  Constitutional:       General: She is not in acute distress.     Appearance: Normal appearance. She is not ill-appearing.   HENT:      Head: Normocephalic and atraumatic.      Nose: Nose normal. No congestion or rhinorrhea.      Mouth/Throat:      Mouth: Mucous membranes are dry.      Pharynx: Oropharynx is clear. No oropharyngeal exudate or posterior oropharyngeal " erythema.   Eyes:      Extraocular Movements: Extraocular movements intact.      Conjunctiva/sclera: Conjunctivae normal.      Pupils: Pupils are equal, round, and reactive to light.   Cardiovascular:      Rate and Rhythm: Regular rhythm. Tachycardia present.      Pulses: Normal pulses.      Heart sounds: Normal heart sounds.   Pulmonary:      Effort: Pulmonary effort is normal. No respiratory distress.      Breath sounds: Normal breath sounds.   Abdominal:      General: Abdomen is flat. Bowel sounds are normal. There is no distension.      Palpations: Abdomen is soft.      Tenderness: There is no abdominal tenderness.   Musculoskeletal:         General: No swelling or tenderness. Normal range of motion.      Cervical back: Normal range of motion and neck supple.   Skin:     General: Skin is warm and dry.      Capillary Refill: Capillary refill takes less than 2 seconds.   Neurological:      General: No focal deficit present.      Mental Status: She is alert and oriented to person, place, and time. Mental status is at baseline.      Cranial Nerves: No cranial nerve deficit.      Sensory: No sensory deficit.      Motor: No weakness.      Coordination: Coordination normal.   Psychiatric:         Mood and Affect: Mood normal.         Behavior: Behavior normal.         Thought Content: Thought content normal.         Judgment: Judgment normal.         Procedures    ED Course:    ED Course as of 11/26/24 2000 Tue Nov 26, 2024   1659 EKG interpreted by me, tachycardia with no concerning ST changes noted, rate of 101 [JE]   1744 No acute process per my independent interpretation of chest x-ray prior to radiology overread [JE]      ED Course User Index  [JE] Sergo Bhat MD       Lab Results (last 24 hours)       Procedure Component Value Units Date/Time    CBC & Differential [[id number]]  (Abnormal) Collected: 11/26/24 1651    Specimen: Blood Updated: 11/26/24 1654    Narrative:      The following orders were  created for panel order CBC & Differential.  Procedure                               Abnormality         Status                     ---------                               -----------         ------                     CBC Auto Differential[[id number]]        Abnormal            Final result                 Please view results for these tests on the individual orders.    Comprehensive Metabolic Panel [[id number]]  (Abnormal) Collected: 11/26/24 1651    Specimen: Blood Updated: 11/26/24 1711     Glucose 92 mg/dL      BUN 9 mg/dL      Creatinine 0.95 mg/dL      Sodium 135 mmol/L      Potassium 4.1 mmol/L      Chloride 100 mmol/L      CO2 23.9 mmol/L      Calcium 8.9 mg/dL      Total Protein 6.7 g/dL      Albumin 4.0 g/dL      ALT (SGPT) 12 U/L      AST (SGOT) 18 U/L      Alkaline Phosphatase 75 U/L      Total Bilirubin 0.5 mg/dL      Globulin 2.7 gm/dL      A/G Ratio 1.5 g/dL      BUN/Creatinine Ratio 9.5     Anion Gap 11.1 mmol/L      eGFR 66.6 mL/min/1.73     Narrative:      GFR Normal >60  Chronic Kidney Disease <60  Kidney Failure <15      Lipase [[id number]]  (Normal) Collected: 11/26/24 1651    Specimen: Blood Updated: 11/26/24 1711     Lipase 29 U/L     Urinalysis With Culture If Indicated - Urine, Clean Catch [[id number]]  (Normal) Collected: 11/26/24 1651    Specimen: Urine, Clean Catch Updated: 11/26/24 1657     Color, UA Yellow     Appearance, UA Clear     pH, UA 5.5     Specific Gravity, UA 1.014     Glucose, UA Negative     Ketones, UA Negative     Bilirubin, UA Negative     Blood, UA Negative     Protein, UA Negative     Leuk Esterase, UA Negative     Nitrite, UA Negative     Urobilinogen, UA 0.2 E.U./dL    Narrative:      In absence of clinical symptoms, the presence of pyuria, bacteria, and/or nitrites on the urinalysis result does not correlate with infection.  Urine microscopic not indicated.    CBC Auto Differential [[id number]]  (Abnormal) Collected: 11/26/24 1651    Specimen: Blood Updated: 11/26/24  1654     WBC 6.95 10*3/mm3      RBC 4.47 10*6/mm3      Hemoglobin 13.8 g/dL      Hematocrit 40.2 %      MCV 89.9 fL      MCH 30.9 pg      MCHC 34.3 g/dL      RDW 13.9 %      RDW-SD 46.3 fl      MPV 10.7 fL      Platelets 191 10*3/mm3      Neutrophil % 83.6 %      Lymphocyte % 8.2 %      Monocyte % 5.3 %      Eosinophil % 2.4 %      Basophil % 0.1 %      Immature Grans % 0.4 %      Neutrophils, Absolute 5.80 10*3/mm3      Lymphocytes, Absolute 0.57 10*3/mm3      Monocytes, Absolute 0.37 10*3/mm3      Eosinophils, Absolute 0.17 10*3/mm3      Basophils, Absolute 0.01 10*3/mm3      Immature Grans, Absolute 0.03 10*3/mm3      nRBC 0.0 /100 WBC     C-reactive Protein [[id number]]  (Abnormal) Collected: 11/26/24 1651    Specimen: Blood Updated: 11/26/24 1746     C-Reactive Protein 2.52 mg/dL     Lactic Acid, Plasma [[id number]]  (Normal) Collected: 11/26/24 1651    Specimen: Blood Updated: 11/26/24 1709     Lactate 1.1 mmol/L     High Sensitivity Troponin T [[id number]]  (Normal) Collected: 11/26/24 1651    Specimen: Blood Updated: 11/26/24 1749     HS Troponin T 6 ng/L     Narrative:      High Sensitive Troponin T Reference Range:  <14.0 ng/L- Negative Female for AMI  <22.0 ng/L- Negative Male for AMI  >=14 - Abnormal Female indicating possible myocardial injury.  >=22 - Abnormal Male indicating possible myocardial injury.   Clinicians would have to utilize clinical acumen, EKG, Troponin, and serial changes to determine if it is an Acute Myocardial Infarction or myocardial injury due to an underlying chronic condition.         BNP [[id number]]  (Normal) Collected: 11/26/24 1651    Specimen: Blood Updated: 11/26/24 1749     proBNP 125.8 pg/mL     Narrative:      This assay is used as an aid in the diagnosis of individuals suspected of having heart failure. It can be used as an aid in the diagnosis of acute decompensated heart failure (ADHF) in patients presenting with signs and symptoms of ADHF to the emergency department  (ED). In addition, NT-proBNP of <300 pg/mL indicates ADHF is not likely.    Age Range Result Interpretation  NT-proBNP Concentration (pg/mL:      <50             Positive            >450                   Gray                 300-450                    Negative             <300    50-75           Positive            >900                  Gray                300-900                  Negative            <300      >75             Positive            >1800                  Gray                300-1800                  Negative            <300    COVID-19 and FLU A/B PCR, 1 HR TAT - Swab, Nasopharynx [[id number]]  (Normal) Collected: 11/26/24 1654    Specimen: Swab from Nasopharynx Updated: 11/26/24 1722     COVID19 Not Detected     Influenza A PCR Not Detected     Influenza B PCR Not Detected    Narrative:      Fact sheet for providers: https://www.fda.gov/media/302277/download    Fact sheet for patients: https://www.fda.gov/media/221653/download    Test performed by PCR.             No radiology results from the last 24 hrs       MDM      Initial impression of presenting illness: Dysuria, fever    DDX: includes but is not limited to: Urinary tract infection, sepsis, pyelonephritis    Patient arrives stable with vitals interpreted by myself.     Pertinent features from physical exam: To auscultation, cardiac rate, regular rhythm, no murmur, nontender to abdominal palpation.    Initial diagnostic plan: CBC, CMP, troponin, UA, CRP, Pro-Noah, lactic acid, magnesium, EKG, chest x-ray, COVID swab    Results from initial plan were reviewed and interpreted by me revealing no concern for sepsis, no concern for bacterial pneumonia, UA clear in the setting of recent antibiotic use    Diagnostic information from other sources: Discussed with significant other bedside reviewed past medical records    Interventions / Re-evaluation: Observed in our Emergency Department for over an hour and a half with no change in vital  signs    Results/clinical rationale were discussed with patient at bedside    Consultations/Discussion of results with other physicians: Discussed with patient negative workup in emergency department, no concern for sepsis or acute process.  Suspect viral illness is etiology of patient's symptoms although discussed that patient should finish her antibiotic course given this could be confounding the picture.  She voiced understanding.  Encouraged her to follow with her primary care doctor to ensure resolution of symptoms and given strict turn precaution for chest pain, shortness of breath, abdominal pain    Disposition plan: Discharge  -----        Final diagnoses:   Viral illness          Sergo Bhat MD  11/26/24 2000

## 2024-11-26 NOTE — Clinical Note
[facility] EMERGENCY DEPARTMENT  [address] [address]  Phone: [phone]    Hayley Colon was seen and treated in our emergency department on 11/26/2024.  She may return to work on 11/29/2024.         Thank you for choosing [facility].    Sergo Bhat MD

## 2025-05-14 ENCOUNTER — APPOINTMENT (OUTPATIENT)
Dept: CT IMAGING | Facility: HOSPITAL | Age: 65
End: 2025-05-14
Payer: OTHER GOVERNMENT

## 2025-05-14 ENCOUNTER — APPOINTMENT (OUTPATIENT)
Dept: GENERAL RADIOLOGY | Facility: HOSPITAL | Age: 65
End: 2025-05-14
Payer: OTHER GOVERNMENT

## 2025-05-14 ENCOUNTER — HOSPITAL ENCOUNTER (EMERGENCY)
Facility: HOSPITAL | Age: 65
Discharge: HOME | End: 2025-05-14
Attending: EMERGENCY MEDICINE | Admitting: EMERGENCY MEDICINE
Payer: OTHER GOVERNMENT

## 2025-05-14 VITALS
HEIGHT: 66 IN | RESPIRATION RATE: 18 BRPM | HEART RATE: 105 BPM | TEMPERATURE: 99.2 F | BODY MASS INDEX: 30.34 KG/M2 | DIASTOLIC BLOOD PRESSURE: 74 MMHG | OXYGEN SATURATION: 90 % | SYSTOLIC BLOOD PRESSURE: 109 MMHG | WEIGHT: 188.8 LBS

## 2025-05-14 DIAGNOSIS — R11.2 NAUSEA AND VOMITING, UNSPECIFIED VOMITING TYPE: Primary | ICD-10-CM

## 2025-05-14 DIAGNOSIS — N39.0 URINARY TRACT INFECTION WITHOUT HEMATURIA, SITE UNSPECIFIED: ICD-10-CM

## 2025-05-14 LAB
ALBUMIN SERPL-MCNC: 4.4 G/DL (ref 3.5–5.2)
ALBUMIN/GLOB SERPL: 1.6 G/DL
ALP SERPL-CCNC: 82 U/L (ref 39–117)
ALT SERPL W P-5'-P-CCNC: 14 U/L (ref 1–33)
ANION GAP SERPL CALCULATED.3IONS-SCNC: 11.8 MMOL/L (ref 5–15)
AST SERPL-CCNC: 19 U/L (ref 1–32)
BACTERIA UR QL AUTO: ABNORMAL /HPF
BASOPHILS # BLD AUTO: 0.02 10*3/MM3 (ref 0–0.2)
BASOPHILS NFR BLD AUTO: 0.2 % (ref 0–1.5)
BILIRUB SERPL-MCNC: 0.5 MG/DL (ref 0–1.2)
BILIRUB UR QL STRIP: NEGATIVE
BUN SERPL-MCNC: 18 MG/DL (ref 8–23)
BUN/CREAT SERPL: 17.8 (ref 7–25)
CALCIUM SPEC-SCNC: 9.3 MG/DL (ref 8.6–10.5)
CHLORIDE SERPL-SCNC: 98 MMOL/L (ref 98–107)
CLARITY UR: ABNORMAL
CO2 SERPL-SCNC: 26.2 MMOL/L (ref 22–29)
COLOR UR: ABNORMAL
CREAT SERPL-MCNC: 1.01 MG/DL (ref 0.57–1)
D-LACTATE SERPL-SCNC: 1.4 MMOL/L (ref 0.5–2)
DEPRECATED RDW RBC AUTO: 44.6 FL (ref 37–54)
EGFRCR SERPLBLD CKD-EPI 2021: 61.9 ML/MIN/1.73
EOSINOPHIL # BLD AUTO: 0.04 10*3/MM3 (ref 0–0.4)
EOSINOPHIL NFR BLD AUTO: 0.4 % (ref 0.3–6.2)
ERYTHROCYTE [DISTWIDTH] IN BLOOD BY AUTOMATED COUNT: 13.7 % (ref 12.3–15.4)
FLUAV RNA RESP QL NAA+PROBE: NOT DETECTED
FLUBV RNA RESP QL NAA+PROBE: NOT DETECTED
GLOBULIN UR ELPH-MCNC: 2.8 GM/DL
GLUCOSE SERPL-MCNC: 108 MG/DL (ref 65–99)
GLUCOSE UR STRIP-MCNC: NEGATIVE MG/DL
HCT VFR BLD AUTO: 42.9 % (ref 34–46.6)
HGB BLD-MCNC: 14.6 G/DL (ref 12–15.9)
HGB UR QL STRIP.AUTO: NEGATIVE
HOLD SPECIMEN: NORMAL
HOLD SPECIMEN: NORMAL
HYALINE CASTS UR QL AUTO: ABNORMAL /LPF
IMM GRANULOCYTES # BLD AUTO: 0.04 10*3/MM3 (ref 0–0.05)
IMM GRANULOCYTES NFR BLD AUTO: 0.4 % (ref 0–0.5)
KETONES UR QL STRIP: NEGATIVE
LEUKOCYTE ESTERASE UR QL STRIP.AUTO: ABNORMAL
LYMPHOCYTES # BLD AUTO: 0.54 10*3/MM3 (ref 0.7–3.1)
LYMPHOCYTES NFR BLD AUTO: 5 % (ref 19.6–45.3)
MCH RBC QN AUTO: 30 PG (ref 26.6–33)
MCHC RBC AUTO-ENTMCNC: 34 G/DL (ref 31.5–35.7)
MCV RBC AUTO: 88.1 FL (ref 79–97)
MONOCYTES # BLD AUTO: 0.46 10*3/MM3 (ref 0.1–0.9)
MONOCYTES NFR BLD AUTO: 4.2 % (ref 5–12)
NEUTROPHILS NFR BLD AUTO: 89.8 % (ref 42.7–76)
NEUTROPHILS NFR BLD AUTO: 9.74 10*3/MM3 (ref 1.7–7)
NITRITE UR QL STRIP: POSITIVE
NRBC BLD AUTO-RTO: 0 /100 WBC (ref 0–0.2)
PH UR STRIP.AUTO: <=5 [PH] (ref 5–8)
PLATELET # BLD AUTO: 215 10*3/MM3 (ref 140–450)
PMV BLD AUTO: 10.7 FL (ref 6–12)
POTASSIUM SERPL-SCNC: 4.3 MMOL/L (ref 3.5–5.2)
PROT SERPL-MCNC: 7.2 G/DL (ref 6–8.5)
PROT UR QL STRIP: NEGATIVE
RBC # BLD AUTO: 4.87 10*6/MM3 (ref 3.77–5.28)
RBC # UR STRIP: ABNORMAL /HPF
REF LAB TEST METHOD: ABNORMAL
SARS-COV-2 RNA RESP QL NAA+PROBE: NOT DETECTED
SODIUM SERPL-SCNC: 136 MMOL/L (ref 136–145)
SP GR UR STRIP: 1.02 (ref 1–1.03)
SQUAMOUS #/AREA URNS HPF: ABNORMAL /HPF
UROBILINOGEN UR QL STRIP: ABNORMAL
WBC # UR STRIP: ABNORMAL /HPF
WBC NRBC COR # BLD AUTO: 10.84 10*3/MM3 (ref 3.4–10.8)
WHOLE BLOOD HOLD COAG: NORMAL
WHOLE BLOOD HOLD SPECIMEN: NORMAL

## 2025-05-14 PROCEDURE — 25010000002 KETOROLAC TROMETHAMINE PER 15 MG: Performed by: EMERGENCY MEDICINE

## 2025-05-14 PROCEDURE — 85025 COMPLETE CBC W/AUTO DIFF WBC: CPT | Performed by: EMERGENCY MEDICINE

## 2025-05-14 PROCEDURE — 87636 SARSCOV2 & INF A&B AMP PRB: CPT | Performed by: EMERGENCY MEDICINE

## 2025-05-14 PROCEDURE — 99285 EMERGENCY DEPT VISIT HI MDM: CPT | Performed by: EMERGENCY MEDICINE

## 2025-05-14 PROCEDURE — 81001 URINALYSIS AUTO W/SCOPE: CPT | Performed by: EMERGENCY MEDICINE

## 2025-05-14 PROCEDURE — 25010000002 CEFTRIAXONE PER 250 MG: Performed by: EMERGENCY MEDICINE

## 2025-05-14 PROCEDURE — 96361 HYDRATE IV INFUSION ADD-ON: CPT

## 2025-05-14 PROCEDURE — 25010000002 ONDANSETRON PER 1 MG: Performed by: EMERGENCY MEDICINE

## 2025-05-14 PROCEDURE — 83605 ASSAY OF LACTIC ACID: CPT | Performed by: EMERGENCY MEDICINE

## 2025-05-14 PROCEDURE — 71045 X-RAY EXAM CHEST 1 VIEW: CPT

## 2025-05-14 PROCEDURE — 87040 BLOOD CULTURE FOR BACTERIA: CPT | Performed by: EMERGENCY MEDICINE

## 2025-05-14 PROCEDURE — 36415 COLL VENOUS BLD VENIPUNCTURE: CPT

## 2025-05-14 PROCEDURE — 25510000001 IOPAMIDOL 61 % SOLUTION: Performed by: EMERGENCY MEDICINE

## 2025-05-14 PROCEDURE — 96365 THER/PROPH/DIAG IV INF INIT: CPT

## 2025-05-14 PROCEDURE — 74177 CT ABD & PELVIS W/CONTRAST: CPT

## 2025-05-14 PROCEDURE — 96375 TX/PRO/DX INJ NEW DRUG ADDON: CPT

## 2025-05-14 PROCEDURE — 80053 COMPREHEN METABOLIC PANEL: CPT | Performed by: EMERGENCY MEDICINE

## 2025-05-14 PROCEDURE — 25810000003 SODIUM CHLORIDE 0.9 % SOLUTION: Performed by: EMERGENCY MEDICINE

## 2025-05-14 RX ORDER — KETOROLAC TROMETHAMINE 30 MG/ML
15 INJECTION, SOLUTION INTRAMUSCULAR; INTRAVENOUS ONCE
Status: COMPLETED | OUTPATIENT
Start: 2025-05-14 | End: 2025-05-14

## 2025-05-14 RX ORDER — SODIUM CHLORIDE 0.9 % (FLUSH) 0.9 %
10 SYRINGE (ML) INJECTION AS NEEDED
Status: DISCONTINUED | OUTPATIENT
Start: 2025-05-14 | End: 2025-05-14 | Stop reason: HOSPADM

## 2025-05-14 RX ORDER — ACETAMINOPHEN 500 MG
1000 TABLET ORAL ONCE
Status: COMPLETED | OUTPATIENT
Start: 2025-05-14 | End: 2025-05-14

## 2025-05-14 RX ORDER — CEFDINIR 300 MG/1
300 CAPSULE ORAL 2 TIMES DAILY
Qty: 14 CAPSULE | Refills: 0 | Status: SHIPPED | OUTPATIENT
Start: 2025-05-14 | End: 2025-05-21

## 2025-05-14 RX ORDER — IOPAMIDOL 612 MG/ML
100 INJECTION, SOLUTION INTRAVASCULAR
Status: COMPLETED | OUTPATIENT
Start: 2025-05-14 | End: 2025-05-14

## 2025-05-14 RX ORDER — NITROFURANTOIN 25; 75 MG/1; MG/1
100 CAPSULE ORAL 2 TIMES DAILY
COMMUNITY
Start: 2025-05-13

## 2025-05-14 RX ORDER — ONDANSETRON 2 MG/ML
8 INJECTION INTRAMUSCULAR; INTRAVENOUS ONCE
Status: COMPLETED | OUTPATIENT
Start: 2025-05-14 | End: 2025-05-14

## 2025-05-14 RX ORDER — PHENAZOPYRIDINE HYDROCHLORIDE 100 MG/1
100 TABLET, FILM COATED ORAL 3 TIMES DAILY PRN
COMMUNITY
Start: 2025-05-13

## 2025-05-14 RX ORDER — ONDANSETRON 4 MG/1
4 TABLET, ORALLY DISINTEGRATING ORAL EVERY 8 HOURS PRN
Qty: 9 TABLET | Refills: 0 | Status: SHIPPED | OUTPATIENT
Start: 2025-05-14 | End: 2025-05-17

## 2025-05-14 RX ADMIN — ACETAMINOPHEN 1000 MG: 500 TABLET, FILM COATED ORAL at 05:55

## 2025-05-14 RX ADMIN — KETOROLAC TROMETHAMINE 15 MG: 30 INJECTION, SOLUTION INTRAMUSCULAR; INTRAVENOUS at 05:56

## 2025-05-14 RX ADMIN — ONDANSETRON 8 MG: 2 INJECTION INTRAMUSCULAR; INTRAVENOUS at 05:56

## 2025-05-14 RX ADMIN — SODIUM CHLORIDE 2000 MG: 9 INJECTION, SOLUTION INTRAVENOUS at 06:04

## 2025-05-14 RX ADMIN — IOPAMIDOL 100 ML: 612 INJECTION, SOLUTION INTRAVENOUS at 06:15

## 2025-05-14 RX ADMIN — SODIUM CHLORIDE 1000 ML: 9 INJECTION, SOLUTION INTRAVENOUS at 05:54

## 2025-05-14 NOTE — ED PROVIDER NOTES
EMERGENCY DEPARTMENT ENCOUNTER    Pt Name: Hayley Colon  MRN: [id number]  Pt :   1960  Room Number:    Date of encounter:  2025  PCP: Marino Carbone MD  ED Provider: Mohan Haney MD    Historian: Patient      HPI:  Chief Complaint: Nausea, vomiting, fever, dysuria        Context: Hayley Colon is a 64 y.o. female who presents to the ED c/o nausea, vomiting, fever and dysuria.  Patient says yesterday afternoon she began having UTI symptoms with dysuria and polyuria.  Was seen at urgent care was diagnosed with UTI and was prescribed Macrobid and Pyridium.  She says this morning she began having generalized myalgias along with nausea, vomiting and chills.  She says she is also having right flank pain.      PAST MEDICAL HISTORY  Past Medical History:   Diagnosis Date    Anxiety     Blood in stool     Diarrhea     GERD (gastroesophageal reflux disease)     History of bone density study     Migraines     Periarthritis of shoulder     Skin cancer, basal cell     shoulder    Wears glasses          PAST SURGICAL HISTORY  Past Surgical History:   Procedure Laterality Date    APPENDECTOMY      CARPAL TUNNEL RELEASE Bilateral     COLONOSCOPY      COLONOSCOPY N/A 2020    Procedure: COLONOSCOPY WITH BIOPSY, COLD BIOPSY POLYPECTOMY;  Surgeon: Christopher Chris MD;  Location: [facility] ENDOSCOPY;  Service: Gastroenterology;  Laterality: N/A;    HYSTERECTOMY      partial still has one complete ovary and one partial ovary    SHOULDER SURGERY Right     RCR, removed part of collar bone, bicep repair/3pins in labriam    SKIN BIOPSY Left     shoulder    VAGINAL DELIVERY      x1         FAMILY HISTORY  Family History   Problem Relation Age of Onset    Cancer Maternal Uncle         bone cancer    Cancer Maternal Grandmother         bone    Colon cancer Neg Hx     Inflammatory bowel disease Neg Hx          SOCIAL HISTORY  Social History     Socioeconomic History    Marital status:     Tobacco Use    Smoking status: Former     Current packs/day: 0.00     Types: Cigarettes     Quit date:      Years since quittin.3    Smokeless tobacco: Never   Vaping Use    Vaping status: Never Used   Substance and Sexual Activity    Alcohol use: No    Drug use: No    Sexual activity: Defer         ALLERGIES  Patient has no known allergies.        REVIEW OF SYSTEMS    All systems reviewed and negative except for those discussed in HPI.       PHYSICAL EXAM    I have reviewed the triage vital signs and nursing notes.    ED Triage Vitals [25 0514]   Temp Heart Rate Resp BP SpO2   100.3 °F (37.9 °C) 112 18 146/73 97 %      Temp src Heart Rate Source Patient Position BP Location FiO2 (%)   Oral Monitor -- -- --         General: Moderate acute distress  Skin: normal color, tactile fever  Head: normocephalic, atraumatic  Eyes: Pupils equally round and reactive to light.  Nose: normal nasal mucosa, no visible deformity.  Mouth: dry mucous membranes.  Neck: supple.  Chest: no retractions, no visible deformity  Cardiovascular: Regular rate and rhythm.  Lungs: clear to auscultation bilaterally.  Abdomen: soft, non-tender, non-distended. No rebound tenderness, no guarding.  No peritonitis.  Back: No CVA tenderness bilaterally.  Neuro:  alert and oriented x3, no focal neurological deficits.  Psych:  appropriate mood and behavior.        LAB RESULTS  Recent Results (from the past 24 hours)   Comprehensive Metabolic Panel    Collection Time: 25  5:24 AM    Specimen: Blood   Result Value Ref Range    Glucose 108 (H) 65 - 99 mg/dL    BUN 18 8 - 23 mg/dL    Creatinine 1.01 (H) 0.57 - 1.00 mg/dL    Sodium 136 136 - 145 mmol/L    Potassium 4.3 3.5 - 5.2 mmol/L    Chloride 98 98 - 107 mmol/L    CO2 26.2 22.0 - 29.0 mmol/L    Calcium 9.3 8.6 - 10.5 mg/dL    Total Protein 7.2 6.0 - 8.5 g/dL    Albumin 4.4 3.5 - 5.2 g/dL    ALT (SGPT) 14 1 - 33 U/L    AST (SGOT) 19 1 - 32 U/L    Alkaline Phosphatase 82 39 - 117 U/L     Total Bilirubin 0.5 0.0 - 1.2 mg/dL    Globulin 2.8 gm/dL    A/G Ratio 1.6 g/dL    BUN/Creatinine Ratio 17.8 7.0 - 25.0    Anion Gap 11.8 5.0 - 15.0 mmol/L    eGFR 61.9 >60.0 mL/min/1.73   Lactic Acid, Plasma    Collection Time: 05/14/25  5:24 AM    Specimen: Blood   Result Value Ref Range    Lactate 1.4 0.5 - 2.0 mmol/L   Green Top (Gel)    Collection Time: 05/14/25  5:24 AM   Result Value Ref Range    Extra Tube Hold for add-ons.    Lavender Top    Collection Time: 05/14/25  5:24 AM   Result Value Ref Range    Extra Tube hold for add-on    Gold Top - SST    Collection Time: 05/14/25  5:24 AM   Result Value Ref Range    Extra Tube Hold for add-ons.    Light Blue Top    Collection Time: 05/14/25  5:24 AM   Result Value Ref Range    Extra Tube Hold for add-ons.    CBC Auto Differential    Collection Time: 05/14/25  5:24 AM    Specimen: Blood   Result Value Ref Range    WBC 10.84 (H) 3.40 - 10.80 10*3/mm3    RBC 4.87 3.77 - 5.28 10*6/mm3    Hemoglobin 14.6 12.0 - 15.9 g/dL    Hematocrit 42.9 34.0 - 46.6 %    MCV 88.1 79.0 - 97.0 fL    MCH 30.0 26.6 - 33.0 pg    MCHC 34.0 31.5 - 35.7 g/dL    RDW 13.7 12.3 - 15.4 %    RDW-SD 44.6 37.0 - 54.0 fl    MPV 10.7 6.0 - 12.0 fL    Platelets 215 140 - 450 10*3/mm3    Neutrophil % 89.8 (H) 42.7 - 76.0 %    Lymphocyte % 5.0 (L) 19.6 - 45.3 %    Monocyte % 4.2 (L) 5.0 - 12.0 %    Eosinophil % 0.4 0.3 - 6.2 %    Basophil % 0.2 0.0 - 1.5 %    Immature Grans % 0.4 0.0 - 0.5 %    Neutrophils, Absolute 9.74 (H) 1.70 - 7.00 10*3/mm3    Lymphocytes, Absolute 0.54 (L) 0.70 - 3.10 10*3/mm3    Monocytes, Absolute 0.46 0.10 - 0.90 10*3/mm3    Eosinophils, Absolute 0.04 0.00 - 0.40 10*3/mm3    Basophils, Absolute 0.02 0.00 - 0.20 10*3/mm3    Immature Grans, Absolute 0.04 0.00 - 0.05 10*3/mm3    nRBC 0.0 0.0 - 0.2 /100 WBC   COVID-19 and FLU A/B PCR, 1 HR TAT - Swab, Nasopharynx    Collection Time: 05/14/25  5:31 AM    Specimen: Nasopharynx; Swab   Result Value Ref Range    COVID19 Not  Detected Not Detected - Ref. Range    Influenza A PCR Not Detected Not Detected    Influenza B PCR Not Detected Not Detected   Urinalysis With Microscopic If Indicated (No Culture) - Urine, Clean Catch    Collection Time: 05/14/25  5:32 AM    Specimen: Urine, Clean Catch   Result Value Ref Range    Color, UA Dark Yellow (A) Yellow, Straw    Appearance, UA Cloudy (A) Clear    pH, UA <=5.0 5.0 - 8.0    Specific Gravity, UA 1.020 1.005 - 1.030    Glucose, UA Negative Negative    Ketones, UA Negative Negative    Bilirubin, UA Negative Negative    Blood, UA Negative Negative    Protein, UA Negative Negative    Leuk Esterase, UA Trace (A) Negative    Nitrite, UA Positive (A) Negative    Urobilinogen, UA 1.0 E.U./dL 0.2 - 1.0 E.U./dL   Urinalysis, Microscopic Only - Urine, Clean Catch    Collection Time: 05/14/25  5:32 AM    Specimen: Urine, Clean Catch   Result Value Ref Range    RBC, UA None Seen None Seen, 0-2 /HPF    WBC, UA 0-2 None Seen, 0-2 /HPF    Bacteria, UA 2+ (A) None Seen /HPF    Squamous Epithelial Cells, UA 0-2 None Seen, 0-2 /HPF    Hyaline Casts, UA None Seen None Seen /LPF    Methodology Manual Light Microscopy        If labs were ordered, I independently reviewed the results and considered them in treating the patient.  See medical decision making discussion section for my interpretation of lab results.        RADIOLOGY  XR Chest 1 View  Result Date: 5/14/2025  PROCEDURE: XR CHEST 1 VW-  HISTORY: Fever  COMPARISON: 11/26/2024  FINDINGS:  Portable view of the chest demonstrates the lungs to be grossly clear. There is no evidence of effusion, pneumothorax or other significant pleural disease. The mediastinum is unremarkable.  The heart size is normal.      Unremarkable portable chest.    This report was signed and finalized on 5/14/2025 7:42 AM by Romulo Weaver MD.      CT Abdomen Pelvis With Contrast  Result Date: 5/14/2025  PROCEDURE: CT ABDOMEN PELVIS W CONTRAST-  TECHNIQUE: IV contrast enhanced exam   HISTORY: R flank pain, fever, N/V, UTI  COMPARISON: None.  FINDINGS:  ABDOMEN: Small hiatal hernia is present. Linear atelectasis left lower lobe is noted. Solid organs are normal. Gallbladder is unremarkable.  No bowel obstruction or bowel wall thickening is present. There is no free fluid or free air.  PELVIS: Pelvic bowel loops are unremarkable. No changes of appendicitis are seen. Appendix is not visualized presumably surgically absent. Patient is status post hysterectomy. Decompressed.      No evidence of pyelonephritis or upper urinary tract obstruction or obvious inflammatory change   This study was performed with techniques to keep radiation doses as low as reasonably achievable (ALARA). Individualized dose reduction techniques using automated exposure control or adjustment of vA and/or kV according to the patient size were employed.  This report was signed and finalized on 5/14/2025 7:42 AM by Romulo Weaver MD.        I ordered and independently reviewed the above noted radiographic studies.  See radiologist's dictation for official interpretation.    Chest radiograph based on my independent initial review shows no evidence of pneumonia or other acute cardiopulmonary process.          PROCEDURES    Procedures    No orders to display       MEDICATIONS GIVEN IN ER    Medications   sodium chloride 0.9 % flush 10 mL (has no administration in time range)   sodium chloride 0.9 % bolus 1,000 mL (0 mL Intravenous Stopped 5/14/25 0731)   ondansetron (ZOFRAN) injection 8 mg (8 mg Intravenous Given 5/14/25 0556)   acetaminophen (TYLENOL) tablet 1,000 mg (1,000 mg Oral Given 5/14/25 0555)   ketorolac (TORADOL) injection 15 mg (15 mg Intravenous Given 5/14/25 0556)   cefTRIAXone (ROCEPHIN) 2,000 mg in sodium chloride 0.9 % 100 mL IVPB-VTB (0 mg Intravenous Stopped 5/14/25 0636)   iopamidol (ISOVUE-300) 61 % injection 100 mL (100 mL Intravenous Given 5/14/25 0615)         MEDICAL DECISION MAKING, PROGRESS, and  CONSULTS    All labs, if obtained, have been independently reviewed by me.  All radiology studies, if obtained, have been reviewed by me and the radiologist dictating the report.  All EKG's, if obtained, have been independently viewed and interpreted by me/my attending physician.      Discussion below represents my analysis of pertinent findings related to patient's condition, differential diagnosis, treatment plan and final disposition.                         Differential diagnosis:    Differential diagnosis for this patient includes hepatitis, cholangitis, cholecystitis, pancreatitis, gastritis, enteritis, colitis, gastroenteritis, appendicitis, volvulus, obstruction, ischemia, torsion, cystitis, pyelonephritis, nephrolithiasis, uretolithiasis,other acute emergency.    Medical Decision Making Discussion:    Vitals reviewed and demonstrate elevated temperature 100.3 and tachycardia but otherwise are normal.    Labs reviewed which show a slight leukocytosis but otherwise unremarkable. Viral panel negative.    UA is not overly convincing for UTI but is nitrite positive with 2+ bacteria.  She also has symptoms consistent with UTI including dysuria and polyuria.  Blood culture sent and patient given 2 g of IV Rocephin.    CT imaging per radiology is negative for acute findings.    On repeat examination patient has tolerated p.o.  She is well-appearing and nontoxic.  Patient was discharged with prescription for p.o. cefdinir and Zofran.  Instructed to follow-up closely with primary care and to return to the Emergency Department before then for inability to eat or drink, persistent or worsening pain, inability to take antibiotics or for any concerning symptoms or new concerns.    Additional sources:    - External (non-ED) record review: OB/GYN note from August 1, 2024 with past history documented of migraines.  Encounter was to discuss hormone replacement therapy for menopausal symptoms.    Shared Decision Making:   After my consideration of clinical presentation and any laboratory/radiology studies obtained, I discussed the findings with the patient/patient representative who is in agreement with the treatment plan and the final disposition.   Risks and benefits of discharge and/or observation/admission were discussed.    Orders placed during this visit:  Orders Placed This Encounter   Procedures    COVID-19 and FLU A/B PCR, 1 HR TAT - Swab, Nasopharynx    Blood Culture With THIEN - Blood,    Blood Culture With THIEN - Blood,    CT Abdomen Pelvis With Contrast    XR Chest 1 View    [place] Draw    Comprehensive Metabolic Panel    Lactic Acid, Plasma    CBC Auto Differential    Urinalysis With Microscopic If Indicated (No Culture) - Urine, Clean Catch    Urinalysis, Microscopic Only - Urine, Clean Catch    Undress & Gown    Continuous Pulse Oximetry    Vital Signs    Oxygen Therapy- Nasal Cannula; Titrate 1-6 LPM Per SpO2; 90 - 95%    Insert Peripheral IV    CBC & Differential    Green Top (Gel)    Lavender Top    Gold Top - SST    Light Blue Top           AS OF 07:51 EDT VITALS:    BP - 146/84  HR - 105  TEMP - 99.2 °F (37.3 °C) (Oral)  O2 SATS - 95%                  DIAGNOSIS  Final diagnoses:   Nausea and vomiting, unspecified vomiting type   Urinary tract infection without hematuria, site unspecified         DISPOSITION  Discharge      Please note that portions of this document were completed with voice recognition software.        Mohan Haney MD  05/14/25 [id number]

## 2025-05-14 NOTE — DISCHARGE INSTRUCTIONS
Patient should follow up with their primary care physician/provider within one week and return to the emergency department before then for any concerning symptoms, worsening symptoms or new concerns.  You should specifically return for inability to take your antibiotics, inability to eat or drink, persistent and/or worsening pain.

## 2025-05-19 LAB
BACTERIA SPEC AEROBE CULT: NORMAL
BACTERIA SPEC AEROBE CULT: NORMAL

## 2025-06-26 DIAGNOSIS — N95.1 MENOPAUSAL SYMPTOMS: ICD-10-CM

## 2025-06-26 DIAGNOSIS — Z90.710 H/O TOTAL HYSTERECTOMY: ICD-10-CM

## 2025-06-26 DIAGNOSIS — N95.2 VAGINAL ATROPHY: ICD-10-CM

## 2025-06-26 DIAGNOSIS — Z79.890 HORMONE REPLACEMENT THERAPY (HRT): ICD-10-CM

## 2025-06-26 RX ORDER — ESTRADIOL 0.06 MG/D
1 PATCH TRANSDERMAL WEEKLY
Qty: 4 EACH | Refills: 1 | Status: SHIPPED | OUTPATIENT
Start: 2025-06-26

## 2025-06-26 NOTE — TELEPHONE ENCOUNTER
Patient has an appointment scheduled on 7/25.  Requesting a refill on patches.  Rx is pended for approval.

## 2025-07-25 ENCOUNTER — OFFICE VISIT (OUTPATIENT)
Dept: OBSTETRICS AND GYNECOLOGY | Facility: CLINIC | Age: 65
End: 2025-07-25
Payer: MEDICARE

## 2025-07-25 VITALS
DIASTOLIC BLOOD PRESSURE: 74 MMHG | HEIGHT: 66 IN | BODY MASS INDEX: 29.54 KG/M2 | SYSTOLIC BLOOD PRESSURE: 124 MMHG | WEIGHT: 183.8 LBS

## 2025-07-25 DIAGNOSIS — N95.2 VAGINAL ATROPHY: ICD-10-CM

## 2025-07-25 DIAGNOSIS — Z90.710 H/O TOTAL HYSTERECTOMY: ICD-10-CM

## 2025-07-25 DIAGNOSIS — Z78.0 POSTMENOPAUSE: ICD-10-CM

## 2025-07-25 DIAGNOSIS — Z79.890 HORMONE REPLACEMENT THERAPY (HRT): Primary | ICD-10-CM

## 2025-07-25 RX ORDER — ESTRADIOL CYPIONATE 5 MG/ML
1.5 INJECTION INTRAMUSCULAR
Qty: 0.3 ML | Refills: 11 | Status: SHIPPED | OUTPATIENT
Start: 2025-07-25

## 2025-08-04 ENCOUNTER — CLINICAL SUPPORT (OUTPATIENT)
Dept: OBSTETRICS AND GYNECOLOGY | Facility: CLINIC | Age: 65
End: 2025-08-04
Payer: MEDICARE

## 2025-08-04 VITALS — HEIGHT: 66 IN | BODY MASS INDEX: 29.41 KG/M2 | WEIGHT: 183 LBS

## 2025-08-04 DIAGNOSIS — Z79.890 HORMONE REPLACEMENT THERAPY (HRT): Primary | ICD-10-CM

## 2025-08-04 PROCEDURE — 96372 THER/PROPH/DIAG INJ SC/IM: CPT | Performed by: OBSTETRICS & GYNECOLOGY

## (undated) DEVICE — LUBE JELLY PK/2.75GM STRL BX/144

## (undated) DEVICE — PAD GRND REM POLYHESIVE A/ DISP

## (undated) DEVICE — VLV SXN AIR/H2O ORCAPOD3 1P/U STRL

## (undated) DEVICE — FRCP BIOP COLD ENDOJAW ALLGTR W/NDL 2.8X2300MM BLU

## (undated) DEVICE — Device

## (undated) DEVICE — ENDOSCOPY PORT CONNECTOR FOR OLYMPUS® SCOPES: Brand: ERBE

## (undated) DEVICE — HYBRID TUBING/CAP SET FOR OLYMPUS® SCOPES: Brand: ERBE